# Patient Record
Sex: FEMALE | Race: BLACK OR AFRICAN AMERICAN | NOT HISPANIC OR LATINO | Employment: UNEMPLOYED | URBAN - METROPOLITAN AREA
[De-identification: names, ages, dates, MRNs, and addresses within clinical notes are randomized per-mention and may not be internally consistent; named-entity substitution may affect disease eponyms.]

---

## 2024-03-08 ENCOUNTER — TELEPHONE (OUTPATIENT)
Dept: TRANSPLANT | Facility: CLINIC | Age: 34
End: 2024-03-08

## 2024-03-11 ENCOUNTER — TELEPHONE (OUTPATIENT)
Dept: TRANSPLANT | Facility: CLINIC | Age: 34
End: 2024-03-11
Payer: MEDICARE

## 2024-04-01 ENCOUNTER — TELEPHONE (OUTPATIENT)
Dept: TRANSPLANT | Facility: CLINIC | Age: 34
End: 2024-04-01
Payer: MEDICARE

## 2024-04-03 DIAGNOSIS — Z76.82 ORGAN TRANSPLANT CANDIDATE: Primary | ICD-10-CM

## 2024-04-15 ENCOUNTER — TELEPHONE (OUTPATIENT)
Dept: TRANSPLANT | Facility: CLINIC | Age: 34
End: 2024-04-15
Payer: MEDICARE

## 2024-04-16 ENCOUNTER — TELEPHONE (OUTPATIENT)
Dept: TRANSPLANT | Facility: CLINIC | Age: 34
End: 2024-04-16
Payer: MEDICARE

## 2024-04-18 ENCOUNTER — HOSPITAL ENCOUNTER (OUTPATIENT)
Dept: RADIOLOGY | Facility: HOSPITAL | Age: 34
Discharge: HOME | End: 2024-04-18
Attending: NURSE PRACTITIONER
Payer: MEDICARE

## 2024-04-18 ENCOUNTER — HOSPITAL ENCOUNTER (OUTPATIENT)
Dept: RADIOLOGY | Facility: HOSPITAL | Age: 34
Discharge: HOME | End: 2024-04-18
Payer: MEDICARE

## 2024-04-18 ENCOUNTER — OFFICE VISIT (OUTPATIENT)
Dept: TRANSPLANT | Facility: CLINIC | Age: 34
End: 2024-04-18
Payer: MEDICARE

## 2024-04-18 VITALS
WEIGHT: 207 LBS | HEART RATE: 70 BPM | RESPIRATION RATE: 16 BRPM | SYSTOLIC BLOOD PRESSURE: 169 MMHG | DIASTOLIC BLOOD PRESSURE: 95 MMHG | OXYGEN SATURATION: 99 % | BODY MASS INDEX: 39.08 KG/M2 | TEMPERATURE: 97 F | HEIGHT: 61 IN

## 2024-04-18 DIAGNOSIS — I12.0 HYPERTENSIVE NEPHROSCLEROSIS, STAGE 5 CHRONIC KIDNEY DISEASE OR END STAGE RENAL DISEASE: Primary | ICD-10-CM

## 2024-04-18 DIAGNOSIS — Z76.82 ORGAN TRANSPLANT CANDIDATE: ICD-10-CM

## 2024-04-18 DIAGNOSIS — Z01.818 PRE-TRANSPLANT EVALUATION FOR CHRONIC KIDNEY DISEASE: ICD-10-CM

## 2024-04-18 DIAGNOSIS — D63.1 ANEMIA, CHRONIC RENAL FAILURE, STAGE 5: ICD-10-CM

## 2024-04-18 DIAGNOSIS — F12.10 CANNABIS ABUSE: ICD-10-CM

## 2024-04-18 DIAGNOSIS — N18.5 CKD (CHRONIC KIDNEY DISEASE) STAGE 5, GFR LESS THAN 15 ML/MIN: ICD-10-CM

## 2024-04-18 DIAGNOSIS — N18.5 ANEMIA, CHRONIC RENAL FAILURE, STAGE 5: ICD-10-CM

## 2024-04-18 DIAGNOSIS — N25.81 SECONDARY HYPERPARATHYROIDISM OF RENAL ORIGIN: ICD-10-CM

## 2024-04-18 PROBLEM — I12.9 HYPERTENSIVE NEPHROSCLEROSIS: Status: ACTIVE | Noted: 2024-04-18

## 2024-04-18 PROCEDURE — 3008F BODY MASS INDEX DOCD: CPT | Mod: CPTII,S$GLB,TXP, | Performed by: INTERNAL MEDICINE

## 2024-04-18 PROCEDURE — 71046 X-RAY EXAM CHEST 2 VIEWS: CPT | Mod: TC,TXP

## 2024-04-18 PROCEDURE — 97802 MEDICAL NUTRITION INDIV IN: CPT | Mod: S$GLB,TXP,, | Performed by: DIETITIAN, REGISTERED

## 2024-04-18 PROCEDURE — 3066F NEPHROPATHY DOC TX: CPT | Mod: CPTII,S$GLB,TXP, | Performed by: INTERNAL MEDICINE

## 2024-04-18 PROCEDURE — 99999 PR PBB SHADOW E&M-EST. PATIENT-LVL IV: CPT | Mod: PBBFAC,TXP,, | Performed by: INTERNAL MEDICINE

## 2024-04-18 PROCEDURE — 76770 US EXAM ABDO BACK WALL COMP: CPT | Mod: TC,TXP

## 2024-04-18 PROCEDURE — 99205 OFFICE O/P NEW HI 60 MIN: CPT | Mod: S$GLB,TXP,, | Performed by: INTERNAL MEDICINE

## 2024-04-18 PROCEDURE — 99204 OFFICE O/P NEW MOD 45 MIN: CPT | Mod: S$GLB,TXP,, | Performed by: PHYSICIAN ASSISTANT

## 2024-04-18 PROCEDURE — 76770 US EXAM ABDO BACK WALL COMP: CPT | Mod: 26,TXP,, | Performed by: RADIOLOGY

## 2024-04-18 PROCEDURE — 1160F RVW MEDS BY RX/DR IN RCRD: CPT | Mod: CPTII,S$GLB,TXP, | Performed by: INTERNAL MEDICINE

## 2024-04-18 PROCEDURE — 71046 X-RAY EXAM CHEST 2 VIEWS: CPT | Mod: 26,TXP,, | Performed by: RADIOLOGY

## 2024-04-18 PROCEDURE — 99204 OFFICE O/P NEW MOD 45 MIN: CPT | Mod: S$GLB,TXP,, | Performed by: TRANSPLANT SURGERY

## 2024-04-18 PROCEDURE — 1159F MED LIST DOCD IN RCRD: CPT | Mod: CPTII,S$GLB,TXP, | Performed by: INTERNAL MEDICINE

## 2024-04-18 PROCEDURE — 3077F SYST BP >= 140 MM HG: CPT | Mod: CPTII,S$GLB,TXP, | Performed by: INTERNAL MEDICINE

## 2024-04-18 PROCEDURE — 3080F DIAST BP >= 90 MM HG: CPT | Mod: CPTII,S$GLB,TXP, | Performed by: INTERNAL MEDICINE

## 2024-04-18 PROCEDURE — 72170 X-RAY EXAM OF PELVIS: CPT | Mod: 26,TXP,, | Performed by: RADIOLOGY

## 2024-04-18 PROCEDURE — 72170 X-RAY EXAM OF PELVIS: CPT | Mod: TC,TXP

## 2024-04-18 RX ORDER — NIFEDIPINE 60 MG/1
60 TABLET, EXTENDED RELEASE ORAL DAILY
COMMUNITY

## 2024-04-18 RX ORDER — CARVEDILOL 25 MG/1
25 TABLET ORAL 2 TIMES DAILY WITH MEALS
COMMUNITY

## 2024-04-18 RX ORDER — TORSEMIDE 100 MG/1
100 TABLET ORAL DAILY
COMMUNITY

## 2024-04-18 RX ORDER — HYDRALAZINE HYDROCHLORIDE 100 MG/1
100 TABLET, FILM COATED ORAL 2 TIMES DAILY
COMMUNITY

## 2024-04-18 RX ORDER — CALCITRIOL 0.25 UG/1
0.5 CAPSULE ORAL DAILY
COMMUNITY

## 2024-04-18 NOTE — PROGRESS NOTES
"TRANSPLANT NUTRITIONAL ASSESSMENT    Referring Provider: JENN Arciniega MD     Reason for Visit: Pre-kidney transplant work-up (pre-dialysis)    Age: 33 y.o.  Sex: female    Patient Active Problem List   Diagnosis    Hypertensive nephrosclerosis    BMI 38.0-38.9,adult    CKD (chronic kidney disease) stage 5, GFR less than 15 ml/min    Anemia, chronic renal failure, stage 5    Secondary hyperparathyroidism of renal origin     Past Medical History:   Diagnosis Date    Anemia     Anemia, chronic renal failure, stage 5 04/18/2024    CKD (chronic kidney disease) stage 5, GFR less than 15 ml/min 04/18/2024    Disorder of kidney and ureter     Hyperparathyroidism, unspecified     Hypertension     Hypertensive nephrosclerosis 04/18/2024    Metabolic acidosis     Obesity     Renal interstitial fibrosis     Secondary hyperparathyroidism of renal origin 04/18/2024    SOB (shortness of breath)     History of    Systemic lupus erythematosus     Urinary tract infection, site not specified     History of     Lab Results   Component Value Date    GLU 79 04/18/2024    K 3.7 04/18/2024    PHOS 3.9 04/18/2024    CHOL 244 (H) 04/18/2024    HDL 43 04/18/2024    TRIG 184 (H) 04/18/2024    ALBUMIN 3.8 04/18/2024    CALCIUM 10.0 04/18/2024     Other Pertinent Labs: No other pertinent labs.     Current Outpatient Medications   Medication Sig Dispense Refill    calcitRIOL (ROCALTROL) 0.25 MCG Cap Take 0.5 mcg by mouth once daily.      carvediloL (COREG) 25 MG tablet Take 25 mg by mouth 2 (two) times daily with meals.      hydrALAZINE (APRESOLINE) 100 MG tablet Take 100 mg by mouth 2 (two) times daily.      NIFEdipine (ADALAT CC) 60 MG TbSR Take 60 mg by mouth once daily.      torsemide (DEMADEX) 100 MG Tab Take 100 mg by mouth once daily.       No current facility-administered medications for this visit.     Allergies: Patient has no known allergies.    Ht Readings from Last 1 Encounters:   04/18/24 5' 1.22" (1.555 m)     Wt Readings " from Last 1 Encounters:   04/18/24 93.9 kg (207 lb 0.2 oz)      BMI: Body mass index is 38.83 kg/m².    Usual Weight: 207#  Weight Change/Time: 27# weight gain since 2021 after catheter surgery  Current Diet: Regular  Appetite/Current Intake: good   Exercise/Physical Activity: , standing/moving around on her feet the entire workday   Nutritional/Herbal Supplements: None  Potential Food/Medication Interactions: reviewed  Chewing/Swallowing Problems: Gets tired of chewing easily, states she has SOB when trying to chew, swallow, and breathe.   Symptoms: vomiting (occurrence 1x/month)  Assessment of Lab Values: Cholesterol: 244 (H), Triglycerides: 184 (H)   Support System: caregiver, she can grocery shop and cook meals at home    Estimated Kcal Need: 1408.5-1878 kcal/day (15-20 kcal/kg/day)  Estimated Protein Need: 55-75 g/day (0.6-0.8 g/kg/day)    Nutritional History: Pt. States her appetite is good and eats 1 meal/day with snacks because she gets tired of chewing when eating a meal. Stated she tries to follow a renal diet.   B: none   L: meat and bread, sandwich with chips (only meal of the day)  D: none   Snacks: chips, fruit (oranges, pineapple, watermelon, grapes)  Beverages: sweet tea, lemonade, cranapple juice, water with flavour enhancer    Nutritional Diagnoses  Problem: undesirable food choices  Etiology: related to diet requirement for a renal diet  Symptoms: as evidenced by diet recall including sugar sweetened beverages, highly processed foods (chips, sandwiches), and limited fruit/vegetable consumption.     Educational Need? yes  Barriers: none identified  Discussed with: patient  Interventions: Patient taught nutrition information regarding Pre-kidney transplant work-up (pre-dialysis)  Renal Nutrition Therapy packet reviewed (high/low food sources of K, Phos and protein, low sodium and fluid intake, emphasis on moderate protein intake).  Encouraged physical activity daily, regular  exercise as tolerated, stay mobile.    Goals/Recommendations: diet adherence, limit high potassium foods, and limit intake of high phosphorus foods  Actions Taken: instruct/provide written information  Strategies Used: problem solving, goal setting, motivational interviewing  Patient and/or family comprehend instructions: yes , adherence expected  Outcome: Verbalizes understanding  Monitoring: Contact information provided, will f/u in clinic and communicate with the care team as needed.     Counseling Time: 15 minutes    I certify that I directed the dietetic intern in service delivery and guided them using my skilled judgment. As the cosigning dietitian, I have reviewed the dietetic interns documentation and am responsible for the treatment, assessment, and plan.  Mateo Bah Dietetic Intern

## 2024-04-18 NOTE — PROGRESS NOTES
Transplant Surgery  Kidney Transplant Recipient Evaluation    Referring Physician: Demian Fu  Current Nephrologist: Demian Fu    Subjective:     Reason for Visit: evaluate transplant candidacy    History of Present Illness: Jaylon Hanks is a 33 y.o. year old female undergoing transplant evaluation.    Dialysis History: Jaylon is on peritoneal dialysis.      Transplant History: N/A    Etiology of Renal Disease: Systemic Lupus Erythematosus (based on medical records from referral).    External provider notes reviewed: Yes    Review of Systems  Objective:     Physical Exam:  Constitutional:   Vitals reviewed: yes   Well-nourished and well-groomed: yes  Eyes:   Sclerae icteric: no   Extraocular movements intact: yes  GI:    Bowel sounds normal: yes   Tenderness: no    If yes, quadrant/location: not applicable   Palpable masses: no    If yes, quadrant/location: not applicable   Hepatosplenomegaly: no   Ascites: no   Hernia: no    If yes, type/location: not applicable   Surgical scars: yes    If yes, type/location: Lap port sites  Resp:   Effort normal: yes   Breath sounds normal: yes    CV:   Regular rate and rhythm: yes   Heart sounds normal: yes   Femoral pulses normal: yes   Extremities edematous: no  Skin:   Rashes or lesions present: no    If yes, describe:not applicable   Jaundice:: no    Musculoskeletal:   Gait normal: yes   Strength normal: yes  Psych:   Oriented to person, place, and time: yes   Affect and mood normal: yes    Additional comments: not applicable    Diagnostics:  The following labs have been reviewed: CBC  CMP  The following radiology images have been independently reviewed and interpreted: CT Abd/Pelvis    Counseling: We provided Jaylon Hanks with a group education session today.  We discussed kidney transplantation at length with her, including risks, potential complications, and alternatives in the management of her renal failure.  The discussion included complications  related to anesthesia, bleeding, infection, primary nonfunction, and ATN.  I discussed the typical postoperative course, length of hospitalization, the need for long-term immunosuppression, and the need for long-term routine follow-up.  I discussed living-donor and -donor transplantation and the relative advantages and disadvantages of each.  I also discussed average waiting times for both living donation and  donation.  I discussed national and center-specific survival rates.  I also mentioned the potential benefit of multicenter listing to candidates listed with centers within more than one organ procurement organization.  All questions were answered.    Patient advised that it is recommended that all transplant candidates, and their close contacts and household members receive Covid vaccination.    Final determination of transplant candidacy will be made once evaluation is complete and reviewed by the Kidney & Kidney/Pancreas Selection Committee.    Coronavirus disease (COVID-19) caused by severe acute respiratory virus coronavirus 2 (SARS-C0V 2) is associated with increased mortality in solid organ transplant recipients (SOT) compared to non-transplant patients. Vaccine responses to vaccination are depressed against SARS-CoV2 compared to normal individuals but improve with third vaccination doses. Vaccination prior to SOT provides both the best opportunity for transplant candidates to develop protective immunity and to reduce the risk of serious COVID19 infections post transplantation. Organ transplant candidates at [facility] will be required to receive SARS-CoV-2 vaccination prior to being listed with a an active status, whenever possible. Exceptions will be made for disability related reasons or for sincerely held Mandaen beliefs.          Transplant Surgery - Candidacy   Assessment/Plan:   Jaylon Hanks has end stage renal disease (ESRD) on dialysis. I  see no surgical contraindication to placing a kidney transplant. Based on available information, Jaylon Hanks is a suitable kidney transplant candidate. I encouraged weight loss and living donation.    Additional testing to be completed according to the Written Order Guidelines for Adult Pre-kidney and Pancreas Transplant Evaluation (KI-02).  Interpretation of tests and discussion of patient management involves all members of the multidisciplinary transplant team.    Yehuda Monique MD

## 2024-04-18 NOTE — PROGRESS NOTES
PRE-TRANSPLANT INFECTIOUS DISEASE CONSULT    Reason for Visit:  Pre-transplant evaluation  Referring Provider: Dr. Demian Fu     History of Present Illness:    33 y.o. female with a history of ERSD 2/2 HTN presents for pre-kidney transplant evaluation. Pt is pre dialysis.     Infectious History:  Recent hospital admissions: No  Recent infections: No  Recent or current antibiotic use: No  History of recurrent infections *(sinus / pneumonia / UTI / SBP)*: No  History of diabetic foot wound or bone/joint infection: No  Recent dental infections, issues or procedures: No  History of chicken pox: Yes  History of shingles: No  History of STI: No  History of COVID infection: No    History of Immunosuppression:  Prior chemotherapy / immunosuppression: No  Prior transplant: No  History of splenectomy: No    Tuberculosis:  Prior screening for latent TB: No  Prior diagnosis of latent TB: No  Risk factors for TB *known exposure, incarceration, homelessness*: No    Geographical exposures:  Currently lives in [place] with boyfriend  Lived in the following states: [place] ([facility]   Lived or travelled to the [facility] [place]: Yes  International travel: No  Travel-associated illness: No      Social/Environmental:  Occupation:   employer, caregiver   Pets: Yes indoor two dogs, beta fish  Livestock: No  Fishing / hunting: No  Hobbies: gardening ocassionally to help partner   Water: [place]  Consumption of raw/undercooked meat or seafood?  No  Tobacco: None  Alcohol: No  Recreational drug use:  Yes, smoke marijuana.   Sexual partners: partner       Past Histories:   Past Medical History:   Diagnosis Date    Anemia     Anemia, chronic renal failure, stage 5 04/18/2024    CKD (chronic kidney disease) stage 5, GFR less than 15 ml/min 04/18/2024    Disorder of kidney and ureter     Hyperparathyroidism, unspecified     Hypertension     Hypertensive nephrosclerosis 04/18/2024    Metabolic acidosis     Obesity     Renal  interstitial fibrosis     Secondary hyperparathyroidism of renal origin 04/18/2024    SOB (shortness of breath)     History of    Systemic lupus erythematosus     Urinary tract infection, site not specified     History of     Past Surgical History:   Procedure Laterality Date    HERNIA REPAIR      PERITONEAL CATHETER INSERTION      11/2020-06/2021     Family History   Problem Relation Name Age of Onset    Hypertension Mother      COPD Mother      Kidney disease Mother      Heart disease Mother          CHF    Lupus Mother      Scleroderma Mother      Hyperlipidemia Father      Hypertension Father      Cancer Father          pancreatic cancer    Hypertension Sister      Stroke Sister      Seizures Sister      No Known Problems Brother       Social History     Tobacco Use    Smoking status: Never    Smokeless tobacco: Never   Substance Use Topics    Alcohol use: Not Currently    Drug use: Yes     Frequency: 7.0 times per week     Types: Marijuana     Comment: 3 to 4 times a day  for the last 2021     Review of patient's allergies indicates:  No Known Allergies      Immunization History:  Received all childhood vaccines: Yes  All household members receive annual flu vaccine: Yes  All household members are up to date on COVID vaccine: No      Current antibiotics:  Antibiotics (From admission, onward)      None              Review of Systems  Review of Systems   Constitutional: Negative for chills, decreased appetite, fever, malaise/fatigue, night sweats, weight gain and weight loss.   HENT:  Negative for congestion, ear pain, hearing loss, hoarse voice, sore throat and tinnitus.    Eyes:  Negative for blurred vision, pain, vision loss in left eye, vision loss in right eye and visual disturbance.   Cardiovascular:  Negative for chest pain, dyspnea on exertion, leg swelling and palpitations.   Respiratory:  Negative for cough, shortness of breath, sputum production and wheezing.    Skin:  Negative for dry skin, itching,  rash and suspicious lesions.   Musculoskeletal:  Negative for back pain, joint pain, myalgias and neck pain.   Gastrointestinal:  Negative for abdominal pain, constipation, diarrhea, heartburn, nausea and vomiting.   Genitourinary:  Negative for dysuria, flank pain, frequency, hematuria, hesitancy and urgency.   Neurological:  Negative for dizziness, headaches, numbness, paresthesias and weakness.   Psychiatric/Behavioral:  Negative for depression and memory loss. The patient does not have insomnia and is not nervous/anxious.           Objective  Physical Exam  Vitals and nursing note reviewed.   Constitutional:       General: She is not in acute distress.     Appearance: She is well-developed. She is not diaphoretic.   HENT:      Head: Normocephalic and atraumatic.   Eyes:      Pupils: Pupils are equal, round, and reactive to light.   Cardiovascular:      Rate and Rhythm: Normal rate and regular rhythm.      Heart sounds: Normal heart sounds. No murmur heard.     No friction rub. No gallop.   Pulmonary:      Effort: Pulmonary effort is normal. No respiratory distress.      Breath sounds: Normal breath sounds. No wheezing or rales.   Chest:      Chest wall: No tenderness.   Abdominal:      General: Bowel sounds are normal. There is no distension.      Palpations: Abdomen is soft. There is no mass.      Tenderness: There is no abdominal tenderness. There is no guarding or rebound.      Hernia: No hernia is present.   Musculoskeletal:         General: No tenderness or deformity. Normal range of motion.      Cervical back: Normal range of motion and neck supple.   Skin:     General: Skin is warm and dry.      Coloration: Skin is not pale.      Findings: No erythema.   Neurological:      Mental Status: She is alert and oriented to person, place, and time.      Cranial Nerves: No cranial nerve deficit.      Coordination: Coordination normal.   Psychiatric:         Behavior: Behavior normal.         Thought Content:  "Thought content normal.           Labs:    CBC:   Lab Results   Component Value Date    WBC 5.44 04/18/2024    HGB 10.3 (L) 04/18/2024    HCT 33.5 (L) 04/18/2024    MCV 74 (L) 04/18/2024     04/18/2024    GRAN 3.4 04/18/2024    GRAN 61.9 04/18/2024    LYMPH 1.4 04/18/2024    LYMPH 25.0 04/18/2024    MONO 0.4 04/18/2024    MONO 7.9 04/18/2024    EOSINOPHIL 4.4 04/18/2024       Syphilis screening: No results found for: "RPR", "PRPQ", "FTAABS"     TB screening: No results found for: "TBGOLDPLUS", "TSPOTSCREN"    HIV screening:   Lab Results   Component Value Date    [id number] Non-reactive 04/18/2024       Strongyloides IgG: No results found for: "STRONGANTIGG"    Hepatitis Serologies:   Lab Results   Component Value Date    HEPAIGG Non-reactive 04/18/2024    HEPBCAB Non-reactive 04/18/2024    HEPBSAB <3.00 04/18/2024    HEPBSAB Non-reactive 04/18/2024    HEPCAB Non-reactive 04/18/2024        Varicella IgG: No results found for: "VARICELLAINT"        There is no immunization history on file for this patient.       Assessment and Plan    1. Risks of Infection: Available serologies were reviewed. No unusual risks of infection or significant barriers to transplantation were identified from the infectious disease standpoint given the information available at this time.    - Acute infectious issues: None   - Pending serologies: HIV, Quantiferon gold / T-spot, RPR, Strongyloides IgG, and VZV IgG will check coccidiodes as lived in [facility].    - Please call if any pending serologic testing is positive.    2. Immunizations:  Based on the patient's immunization history and serologies, the following immunizations are recommended:  - Hepatitis A    Patient does not have immunity to hepatitis A    Vaccination ordered today: Yes   - Hepatitis B    Patient does not have immunity to hepatitis B    Vaccination ordered today: Yes   - COVID    Current CDC vaccination recommendations were discussed with the patient   - Annual " high dose influenza     Vaccination ordered today: No. Reason for not ordering: vaccination up to date   - Prevnar 20    Vaccination ordered today: Yes   - Tdap    Vaccination ordered today: Yes   - Shingrix    Vaccination ordered today: Yes    Recommended Pre-Transplant Immunization Schedule   Vaccine  0m 1m 2m 6m   Pneumococcal conjugate vaccine (Prevnar 20) X      Tetanus-diphtheria-pertussis (Tdap)* X      Hepatitis A Vaccine (Havrix)** X   X   Hepatitis B Vaccine (Heplisav)** X X     Influenza (annual) X      Zoster Recombinant Vaccine (Shingrix) X  X           *Administer booster every 10 years.       **Administer if no immunity demonstrated on serologies               Patient will receive vaccines at local pharmacy. A written prescription was provided for all vaccine doses.     3. Counseling:   I discussed with the patient the risk for increased susceptibility to infections following transplantation including increased risk for infection right after transplant and if rejection should occur.  The patient has been counseled on the importance of vaccinations to decrease risk of infection and severe illness. Specific guidance has been provided to the patient regarding the patient's occupation, hobbies and activities to avoid future infectious complications.     4. Transplant Candidacy: Based on available information, there are no identified significant barriers to transplantation from an infectious disease standpoint.  Final determination of transplant candidacy will be made once evaluation is complete and reviewed by the Selection Committee.      Follow up with infectious disease as needed.       The total time for evaluation and management services performed on 04/18/2024 was greater than 35 minutes.

## 2024-04-18 NOTE — LETTER
April 22, 2024        Demian Fu  [address]  [facility] [address]  Phone: [phone]  Fax: [phone]             [facility] Trans[facility] 1st Fl  [address]  [facility] [address]  Phone: [phone]   Patient: Jaylon Hanks   MR Number: [id number]   YOB: 1990   Date of Visit: 4/18/2024       Dear Dr. Demian Fu    Thank you for referring Jaylon Hanks to me for evaluation. Attached you will find relevant portions of my assessment and plan of care.    If you have questions, please do not hesitate to call me. I look forward to following Jaylon Hanks along with you.    Sincerely,    Kareem Salguero MD    Enclosure    If you would like to receive this communication electronically, please contact [email or web address] or [phone] to request [employer] Link access.    [employer] Link is a tool which provides read-only access to select patient information with whom you have a relationship. Its easy to use and provides real time access to review your patients record including encounter summaries, notes, results, and demographic information.    If you feel you have received this communication in error or would no longer like to receive these types of communications, please e-mail [email or web address]

## 2024-04-18 NOTE — PROGRESS NOTES
Transplant Nephrology  Kidney Transplant Recipient Evaluation    Referring Physician: Demian Fu  Current Nephrologist: Demian Fu    Subjective:   CC:  Initial evaluation of kidney transplant candidacy.    HPI:  Ms. Hanks is a 33 y.o. year old Black or  female who has presented to be evaluated as a potential kidney transplant recipient.  She has advanced kidney disease secondary to HTN.  Patient is currently pre-dialysis. She has a  no dialysis access   . She had a PD catheter placed in 2021 but did not function, migrated and was removed at the time she and a hernia surgery.  At the present time no living donors. BMI is 38 and not very active. Works full time. She is here with  her boyfriend who is the care giver.   She uses cannabis daily three times a day. Does no use any other illegal drug    Previous Transplant: no    Past Medical and Surgical History: Ms. Hanks  has a past medical history of Anemia, Disorder of kidney and ureter, Hyperparathyroidism, unspecified, Hypertension, Metabolic acidosis, Obesity, Renal interstitial fibrosis, SOB (shortness of breath), Systemic lupus erythematosus, and Urinary tract infection, site not specified.  She has a past surgical history that includes Peritoneal catheter insertion and Hernia repair.    Past Social and Family History: Ms. Hanks reports that she has never smoked. She has never used smokeless tobacco. She reports that she does not currently use alcohol. She reports current drug use. Frequency: 7.00 times per week. Drug: Marijuana. Her family history includes COPD in her mother; Cancer in her father; Heart disease in her mother; Hyperlipidemia in her father; Hypertension in her father, mother, and sister; Kidney disease in her mother; Lupus in her mother; No Known Problems in her brother; Scleroderma in her mother; Seizures in her sister; Stroke in her sister.    Current Outpatient Medications on File Prior to Visit   Medication Sig  "Dispense Refill    calcitRIOL (ROCALTROL) 0.25 MCG Cap Take 0.5 mcg by mouth once daily.      carvediloL (COREG) 25 MG tablet Take 25 mg by mouth 2 (two) times daily with meals.      hydrALAZINE (APRESOLINE) 100 MG tablet Take 100 mg by mouth 2 (two) times daily.      NIFEdipine (ADALAT CC) 60 MG TbSR Take 60 mg by mouth once daily.      torsemide (DEMADEX) 100 MG Tab Take 100 mg by mouth once daily.       No current facility-administered medications on file prior to visit.        Review of Systems    Skin: no skin rash  CNS; no headaches, blurred vision, seizure, or syncope  ENT: No JVD,  Adenopathies,  nasal congestion. No oral lesions  Cardiac: No chest pain, dyspnea, claudication, edema or palpitations  Respiratory: No SOB, cough, hemoptysis   Gastro-intestinal: No diarrhea, constipation, abdominal pain, nausea, vomit. No ascitis  Genitourinary: no hematuria, dysuria, frequency, frequency  Musculoskeletal: joint pain, arthritis or vasculitic changes  Psych: alert awake, oriented, No cranial nerves deficit.      Objective:   Blood pressure (!) 169/95, pulse 70, temperature 97.3 °F (36.3 °C), temperature source Temporal, resp. rate 16, height 5' 1.22" (1.555 m), weight 93.9 kg (207 lb 0.2 oz), SpO2 99%.body mass index is 38.83 kg/m².    Physical Exam    Head: normocephalic  Neck: No JVD, cervical axillary, or femoral adenopathies  Heart: no murmurs, Normal s1 and s2, No gallops, no rubs, No murmurs  Lungs; CTA, good respiratory effort, no crackles  Abdomen: soft, non tender, no splenomegaly or hepatomegaly, no massess, no bruits. Abdominal obesity   Extremities: No edema, skin rash, joint pain  SNC: awake, alert oriented. Cranial nerves are intact, no focalized, sensitivity and strength preserved      Labs:  Lab Results   Component Value Date    WBC 5.44 04/18/2024    HGB 10.3 (L) 04/18/2024    HCT 33.5 (L) 04/18/2024       No results found for: "PREALBUMIN", "BILIRUBINUA", "GGT", "AMYLASE", "LIPASE", "PROTEINUA", " ""NITRITE", "RBCUA", "WBCUA"    No results found for: "HLAABCTYPE"    Labs were reviewed with the patient.    Assessment:     1. Hypertensive nephrosclerosis, stage 5 chronic kidney disease or end stage renal disease    2. BMI 38.0-38.9,adult    3. CKD (chronic kidney disease) stage 5, GFR less than 15 ml/min    4. Anemia, chronic renal failure, stage 5    5. Secondary hyperparathyroidism of renal origin        Plan:     Transplant Candidacy:   Based on available information, Ms. Hanks is a suitable kidney transplant candidate.   Meets center eligibility for accepting HCV+ donor offer - Yes.  Patient educated on HCV+ donors. Jaylon is willing to accept HCV+ donor offer - Yes   Patient is a candidate for KDPI > 85 kidney donor offer - No.  Final determination of transplant candidacy will be made once workup is complete and reviewed by the selection committee.    We discussed excessive cannabis use and implications for kidney transplant outcomes. Will order a drug screen.   We discussed living donation, wt loss.  Extensive education provided  All questions answered.     Patient advised that it is recommended that all transplant candidates, and their close contacts and household members receive Covid vaccination.    UNOS Patient Status  Functional Status: 80% - Normal activity with effort: some symptoms of disease    Diabetes: No   Kareem Salguero MD         "

## 2024-04-18 NOTE — PROGRESS NOTES
PHARM.D. PRE-TRANSPLANT NOTE:    This patient's medication therapy was evaluated as part of her pre-transplant evaluation.      The following general pharmacologic concerns were noted: None     The following concerns for post-operative pain management were noted: None    The following pharmacologic concerns related to HCV therapy were noted: None      This patient's medication profile was reviewed for considerations for DAA Hepatitis C therapy:    [X]  No current inducers of CYP 3A4 or PGP  [X]  No amiodarone on this patient's EMR profile in the last 24 months  [X]  No past or current atrial fibrillation on this patient's EMR profile       Current Outpatient Medications   Medication Sig Dispense Refill    calcitRIOL (ROCALTROL) 0.25 MCG Cap Take 0.5 mcg by mouth once daily.      carvediloL (COREG) 25 MG tablet Take 25 mg by mouth 2 (two) times daily with meals.      hydrALAZINE (APRESOLINE) 100 MG tablet Take 100 mg by mouth 2 (two) times daily.      NIFEdipine (ADALAT CC) 60 MG TbSR Take 60 mg by mouth once daily.      torsemide (DEMADEX) 100 MG Tab Take 100 mg by mouth once daily.       No current facility-administered medications for this visit.           I am available for consultation and can be contacted, as needed by the other members of the Transplant team.

## 2024-04-21 NOTE — PROGRESS NOTES
"INITIAL PATIENT EDUCATION NOTE     Ms. Jaylon Hanks was seen in pre-kidney transplant clinic for evaluation for kidney, kidney/pancreas or pancreas only transplant.  The patient attended an individual video education session that discussed/reviewed the following aspects of transplantation: evaluation including diagnostic and laboratory testing,(Chemistries, Hematology, Serologies including HIV and Hepatitis and HLA) required for transplantation and selection committee process, UNOS waitlist management/multiple listings, types of organs offered (KDPI < 85%, KDPI > 85%, PHS risk, DCD, HCV+, HIV+ for HIV+ recipients and enbloc/dual), financial aspects, surgical procedures, dietary instruction pre- and post-transplant, health maintenance pre- and post-transplant, post-transplant hospitalization and outpatient follow-up, potential to participate in a research protocol, and medication management and side effects.  A question and answer session was provided after the presentation.    The patient was seen by all members of the multi-disciplinary team to include: Nephrologist/RITIKA, Surgeon, , Transplant Coordinator, , Pharmacist and Dietician (if applicable).    The patient reviewed and signed all consents for evaluation which were witnessed and sent to scanning into the [facility] chart.    The patient was given an education book and plan for further evaluation based on her individual assessment.    The Patient was educated on OPTN policy change regarding race based eGFR. For Black or  Americans, this eGFR could have shown that their kidneys were working better than they were.    Because of this change, we are looking at everyone's record and assessing waiting time for people who are eligible. We will be reviewing your medical records and will notify you if you are eligible. We also encouraged patient to provide "span 20 labs" that are not in our electronic medical records.     Reviewed " program requirement for complete COVID vaccination with documentation prior to listing.  COVID education information reviewed with patient. Patient encouraged to be up to date on all vaccinations.     The patient was informed that the transplant team would manage immediate post op pain. If the patient requires long term pain management, they will need to have that pain management addressed by their PCP or previous provider who wrote for long term pain medicines.    The patient was encouraged to call with any questions or concerns.

## 2024-04-24 NOTE — PROGRESS NOTES
Transplant Recipient Adult Psychosocial Assessment    Jaylon Hanks  [address] [facility] [address]  Telephone Information:   Mobile [phone]   Home  [phone] (home)  Work  There is no work phone number on file.  E-mail  [email or web address]    Sex: female  YOB: 1990  Age: 33 y.o.    Encounter Date: 2024  [place] Citizen: yes  Primary Language: English   Needed: no    Emergency Contact:  Name: Jose Maria Beckford  Relationship: significant other  Address: same as pt   Phone Numbers:[phone] (mobile)    Family/Social Support:   Number of dependents/: Pt reports no minor dependents   Marital history: Pt reports no marital history. Pt reports current relationship with Jose Maria for 3.5 yrs  Other family dynamics: Pt resides with s/o Jose Maria. Pt reports highly supportive mother and 5 siblings.  Pt's father is .     Household Composition:  Name: Jose Maria Beckford  Age: 28  Number: [phone]  Relationship: significant other  Does person drive? yes    Do you and your caregivers have access to reliable transportation? yes  PRIMARY CAREGIVER: Jose Maria Beckford will be primary caregiver, phone number [phone].      provided in-depth information to patient and caregiver regarding pre- and post-transplant caregiver role.   strongly encourages patient and caregiver to have concrete plan regarding post-transplant care giving, including back-up caregiver(s) to ensure care giving needs are met as needed.    Patient and Caregiver states understanding all aspects of caregiver role/commitment and is able/willing/committed to being caregiver to the fullest extent necessary.    Patient and Caregiver verbalizes understanding of the education provided today and caregiver responsibilities.         remains available. Patient and Caregiver agree to contact  in a timely manner if concerns arise.      Able to take time off work  without financial concerns: yes.     Additional Significant Others who will Assist with Transplant:  Name: Silvana Tena   Age: 64  Number: [phone]  City: [place] State: [place]  Relationship: mother  Does person drive? yes    Name: Roz Tena  Age: none provided   Number: [phone]  City: [place]: [place]  Relationship: sister  Does person drive? yes    Living Will: no  Healthcare Power of : no Pt reports trusting mother with medical decisions  Advance Directives on file: <<no information> per medical record.  Verbally reviewed LW/HCPA information.   provided patient with copy of LW/HCPA documents and provided education on completion of forms.    Living Donors: No. Education and resource information given to patient.    Highest Education Level: High School (9-12) or GED  Reading Ability: 12th grade  Reports difficulty with: N/A  Learns Best By:  A combination of verbal, written, and hands-on instruction.       Status: no  VA Benefits: no     Working for Income: yes  If yes, working activity level: Working Full Time  Patient is employed as a  with [employer]. Pt reports plans to pay for STD/LTD.    Spouse/Significant Other Employment: Pt's s/o is employed. Jose Maria reports employed is highly supportive.    Disabled: no    Monthly Income:  Salary/Wages: $1,700  Able to afford all costs now and if transplanted, including medications: yes  Patient and Caregiver verbalizes understanding of personal responsibilities related to transplant costs and the importance of having a financial plan to ensure that patients transplant costs are fully covered.       provided fundraising information/education. Patient and Caregiververbalizes understanding.   remains available.    Insurance:   Payer/Plan Subscr  Sex Relation Sub. Ins. ID Effective Group Num   1. [employer] [facility]* CARMEN TENA 1990 Female Self [id number] [address]                                     [address] [address], [facility] [address]   2. [place]MEN TE[place] 1990 Female Self [id number]* 4/1/21                                    [address] [address]     Primary Insurance (for UNOS reporting): Public Insurance - Medicare FFS (Fee For Service)  Secondary Insurance (for UNOS reporting): Public Insurance - Medicaid  Patient and Caregiver verbalizes clear understanding that patient may experience difficulty obtaining and/or be denied insurance coverage post-surgery. This includes and is not limited to disability insurance, life insurance, health insurance, burial insurance, long term care insurance, and other insurances.      Patient and Caregiver also reports understanding that future health concerns related to or unrelated to transplantation may not be covered by patient's insurance.  Resources and information provided and reviewed.     Patient and Caregiver provides verbal permission to release any necessary information to outside resources for patient care and discharge planning.  Resources and information provided are reviewed.      Dialysis Adherence: Patient reports as predialysis. Pt reports GFR is at 23%.  Pt reports having PD port was removed in 2021.    Infusion Service: patient utilizing? no  Home Health: patient utilizing? no  DME: yes [facility]  Pulmonary/Cardiac Rehab: Pt denies    ADLS:  Pt reports pt is independent with all ADLS including driving, bathing,walking,taking medications, cooking, housekeeping, eating, and shopping.      Adherence: Adherence education and counseling provided.     Per History Section:  Past Medical History:   Diagnosis Date    Anemia     Anemia, chronic renal failure, stage 5 04/18/2024    CKD (chronic kidney disease) stage 5, GFR less than 15 ml/min 04/18/2024    Disorder of kidney and ureter     Hyperparathyroidism, unspecified     Hypertension     Hypertensive nephrosclerosis 04/18/2024    Metabolic acidosis     Obesity     Renal  "interstitial fibrosis     Secondary hyperparathyroidism of renal origin 04/18/2024    SOB (shortness of breath)     History of    Systemic lupus erythematosus     Urinary tract infection, site not specified     History of     Social History     Tobacco Use    Smoking status: Never    Smokeless tobacco: Never   Substance Use Topics    Alcohol use: Not Currently     Social History     Substance and Sexual Activity   Drug Use Yes    Frequency: 7.0 times per week    Types: Marijuana    Comment: 3 to 4 times a day  for the last 2021     Social History     Substance and Sexual Activity   Sexual Activity Yes    Partners: Male       Per Today's Psychosocial:  Tobacco: none, patient denies any use.  Alcohol: none, patient denies any use.  Illicit Drugs/Non-prescribed Medications:  Pt utilizes marijuana and reports it helps her appetite. 3-4 "joints" per day. Pt reports willingness to quit if required  .    Patient and Caregiver states clear understanding of the potential impact of substance use as it relates to transplant candidacy and is aware of possible random substance screening.  Substance abstinence/cessation counseling, education and resources provided and reviewed.     Arrests/DWI/Treatment/Rehab: patient denies    Psychiatric History:    Mental Health: Pt denies history of anxiety, depression and or overwhelming feelings of sadness at this time or in the past. Pt reports "I don't have a lot of bad days. I just have normal life stressors that might bother me from time to time. But I am okay. I am just trying to stay up on my health". SW provided acknowledgement, active listening, validation, normalization, support and encouragement. Pt reports having a highly supportive support systems.  Psychiatrist/Counselor: Pt denies currently or in the past and reports willingness to meet with psychiatry if required by transplant.   Medications:  Pt denies utilizing mental health medications currently or in the " past  Suicide/Homicide Issues: Pt denies feelings of wanting to harm self or other currently or in the past  Safety at home: Pt reports feeling safe at home.     Knowledge: Patient and Caregiver states having clear understanding and realistic expectations regarding the potential risks and potential benefits of organ transplantation and organ donation and agrees to discuss with health care team members and support system members, as well as to utilize available resources and express questions and/or concerns in order to further facilitate the pt informed decision-making.  Resources and information provided and reviewed.    Patient and Caregiver is aware of [facility] affiliation and/or partnership with agencies in home health care, LTAC, SNF, [facility], and other hospitals and clinics.    Understanding: Patient and Caregiver reports having a clear understanding of the many lifetime commitments involved with being a transplant recipient, including costs, compliance, medications, lab work, procedures, appointments, concrete and financial planning, preparedness, timely and appropriate communication of concerns, abstinence (ETOH, tobacco, illicit non-prescribed drugs), adherence to all health care team recommendations, support system and caregiver involvement, appropriate and timely resource utilization and follow-through, mental health counseling as needed/recommended, and patient and caregiver responsibilities.  Social Service Handbook, resources and detailed educational information provided and reviewed.  Educational information provided.    Patient and Caregiver also reports current and expected compliance with health care regime and states having a clear understanding of the importance of compliance.      Patient and Caregiver reports a clear understanding that risks and benefits may be involved with organ transplantation and with organ donation.       Patient and Caregiver also reports clear understanding that  psychosocial risk factors may affect patient, and include but are not limited to feelings of depression, generalized anxiety, anxiety regarding dependence on others, post traumatic stress disorder, feelings of guilt and other emotional and/or mental concerns, and/or exacerbation of existing mental health concerns.  Detailed resources provided and discussed.      Patient and Caregiver agrees to access appropriate resources in a timely manner as needed and/or as recommended, and to communicate concerns appropriately.  Patient and Caregiver also reports a clear understanding of treatment options available.     Patient and Caregiver received education in a group setting.   reviewed education, provided additional information, and answered questions.    Feelings or Concerns: Pt denies having any concerns and or overwhelming feelings regarding transplant at this time.       Coping: Identify Patient & Caregiver Strategies to [place]:   1. In the past, coping with major surgery and/or related stress - familial events    2. Currently & Pre-transplant - family gatherings   3. At the time of surgery - Family support   4. During post-Transplant & Recovery Period - family support    Goals: Pt reports post transplant goal of retaining new normalcy.  Patient referred to Vocational Rehabilitation.    Interview Behavior: Patient and Caregiver presents as alert and oriented x 4, pleasant, good eye contact, well groomed, recall good, concentration/judgement good, average intelligence, calm, communicative, cooperative, and asking and answering questions appropriately. Pt presents with highly supportive s/o Presten. Pt and s/o were highly engaged and motivated for transplant.         Transplant Social Work - Candidacy  Assessment/Plan:     Psychosocial Suitability: Patient presents as a suitable candidate for transplant at this time. Based on psychosocial risk factors, patient presents as low risk, due to absence of  overwhelming psychosocial concerns .    Recommendations/Additional Comments: SW recommends that pt conduct fundraising to assist pt with pay for cost of medications, food, gas, and other transplant related needs. SW recommends that pt remain aware of potential mental health concerns and contact the team if any concerns arise. SW recommends that pt remain abstinent from tobacco, ETOH, and drug use. SW supports pt's continued dialysis adherence. SW remains available to answer any questions or concerns that arise as the pt moves through the transplant process.     Gordo Suh, LYDIA, LMSW

## 2024-04-26 ENCOUNTER — DOCUMENTATION ONLY (OUTPATIENT)
Dept: TRANSPLANT | Facility: CLINIC | Age: 34
End: 2024-04-26
Payer: MEDICARE

## 2024-04-26 ENCOUNTER — TELEPHONE (OUTPATIENT)
Dept: TRANSPLANT | Facility: CLINIC | Age: 34
End: 2024-04-26
Payer: MEDICARE

## 2024-04-26 NOTE — TELEPHONE ENCOUNTER
Phoned patient and informed about additional testing needed for Eval: 2ABO, Stress Test, Echo and Gynecological appointment. Patient wishes to have mailed and done closer to home. Orders being mailed to patient.

## 2024-05-28 ENCOUNTER — TELEPHONE (OUTPATIENT)
Dept: TRANSPLANT | Facility: CLINIC | Age: 34
End: 2024-05-28
Payer: MEDICARE

## 2024-05-28 NOTE — TELEPHONE ENCOUNTER
"----- Message from Cesar Savage sent at 5/28/2024  4:42 PM CDT -----  Consult/Advisory    Name Of Caller: Self    Contact Preference?: [phone]     What is the nature of the call?: Calling to notify Maurice about her upcoming appts (to help meet criteria)    Additional Notes:  "Thank you for all that you do for our patients"  "

## 2024-06-03 ENCOUNTER — TELEPHONE (OUTPATIENT)
Dept: TRANSPLANT | Facility: CLINIC | Age: 34
End: 2024-06-03
Payer: MEDICARE

## 2024-06-03 DIAGNOSIS — Z76.82 ORGAN TRANSPLANT CANDIDATE: Primary | ICD-10-CM

## 2024-06-04 ENCOUNTER — TELEPHONE (OUTPATIENT)
Dept: TRANSPLANT | Facility: CLINIC | Age: 34
End: 2024-06-04
Payer: MEDICARE

## 2024-06-04 NOTE — TELEPHONE ENCOUNTER
Spoke to pt regarding tests that needed to be scheduled. Pt stated she had a Pap Smear done on 5/23. She will go to the office and have the records faxed tomorrow-6/5 . Pt stated she did not have any of the other test completed at this time. She is in the process of getting a new PCP and her appointment is set for August 14.

## 2024-06-07 DIAGNOSIS — Z76.82 ORGAN TRANSPLANT CANDIDATE: Primary | ICD-10-CM

## 2024-06-20 ENCOUNTER — TELEPHONE (OUTPATIENT)
Dept: CARDIOLOGY | Facility: HOSPITAL | Age: 34
End: 2024-06-20
Payer: MEDICARE

## 2024-06-24 ENCOUNTER — HOSPITAL ENCOUNTER (OUTPATIENT)
Dept: CARDIOLOGY | Facility: HOSPITAL | Age: 34
Discharge: HOME | End: 2024-06-24
Attending: NURSE PRACTITIONER
Payer: MEDICARE

## 2024-06-24 VITALS
HEART RATE: 81 BPM | BODY MASS INDEX: 39.08 KG/M2 | DIASTOLIC BLOOD PRESSURE: 78 MMHG | WEIGHT: 207 LBS | HEIGHT: 61 IN | SYSTOLIC BLOOD PRESSURE: 167 MMHG | RESPIRATION RATE: 16 BRPM

## 2024-06-24 VITALS
SYSTOLIC BLOOD PRESSURE: 167 MMHG | HEART RATE: 80 BPM | HEIGHT: 59 IN | WEIGHT: 205 LBS | DIASTOLIC BLOOD PRESSURE: 78 MMHG | BODY MASS INDEX: 41.33 KG/M2

## 2024-06-24 DIAGNOSIS — Z76.82 ORGAN TRANSPLANT CANDIDATE: ICD-10-CM

## 2024-06-24 LAB
ASCENDING AORTA: 2.92 CM
AV INDEX (PROSTH): 0.91
AV MEAN GRADIENT: 8 MMHG
AV PEAK GRADIENT: 12 MMHG
AV VALVE AREA BY VELOCITY RATIO: 3.63 CM²
AV VALVE AREA: 3.98 CM²
AV VELOCITY RATIO: 0.83
BSA FOR ECHO PROCEDURE: 1.97 M2
CV ECHO LV RWT: 0.34 CM
CV STRESS BASE HR: 67 BPM
DIASTOLIC BLOOD PRESSURE: 92 MMHG
DOP CALC AO PEAK VEL: 1.76 M/S
DOP CALC AO VTI: 38.69 CM
DOP CALC LVOT AREA: 4.4 CM2
DOP CALC LVOT DIAMETER: 2.36 CM
DOP CALC LVOT PEAK VEL: 1.46 M/S
DOP CALC LVOT STROKE VOLUME: 154.03 CM3
DOP CALCLVOT PEAK VEL VTI: 35.23 CM
E WAVE DECELERATION TIME: 293.91 MSEC
E/A RATIO: 1.42
E/E' RATIO: 11.88 M/S
ECHO LV POSTERIOR WALL: 0.9 CM (ref 0.6–1.1)
EJECTION FRACTION- HIGH: 59 %
END DIASTOLIC INDEX-HIGH: 155 ML/M2
END DIASTOLIC INDEX-LOW: 91 ML/M2
END SYSTOLIC INDEX-HIGH: 78 ML/M2
END SYSTOLIC INDEX-LOW: 40 ML/M2
FRACTIONAL SHORTENING: 42 % (ref 28–44)
INTERVENTRICULAR SEPTUM: 0.9 CM (ref 0.6–1.1)
IVRT: 125.59 MSEC
LA MAJOR: 5.68 CM
LA MINOR: 4.72 CM
LA WIDTH: 3.41 CM
LEFT ATRIUM SIZE: 4.19 CM
LEFT ATRIUM VOLUME INDEX MOD: 29.6 ML/M2
LEFT ATRIUM VOLUME INDEX: 33.7 ML/M2
LEFT ATRIUM VOLUME MOD: 55 CM3
LEFT ATRIUM VOLUME: 62.61 CM3
LEFT INTERNAL DIMENSION IN SYSTOLE: 3.05 CM (ref 2.1–4)
LEFT VENTRICLE DIASTOLIC VOLUME INDEX: 74.58 ML/M2
LEFT VENTRICLE DIASTOLIC VOLUME: 138.72 ML
LEFT VENTRICLE MASS INDEX: 94 G/M2
LEFT VENTRICLE SYSTOLIC VOLUME INDEX: 19.5 ML/M2
LEFT VENTRICLE SYSTOLIC VOLUME: 36.32 ML
LEFT VENTRICULAR INTERNAL DIMENSION IN DIASTOLE: 5.3 CM (ref 3.5–6)
LEFT VENTRICULAR MASS: 174.52 G
LEFT VENTRICULAR OUTFLOW TRACT PEAK GRADIENT REST: 10 MMHG
LV LATERAL E/E' RATIO: 11.22 M/S
LV SEPTAL E/E' RATIO: 12.63 M/S
MV A" WAVE DURATION": 15.6 MSEC
MV PEAK A VEL: 0.71 M/S
MV PEAK E VEL: 1.01 M/S
NUC REST DIASTOLIC VOLUME INDEX: 108
NUC REST EJECTION FRACTION: 57
NUC REST SYSTOLIC VOLUME INDEX: 62
NUC STRESS DIASTOLIC VOLUME INDEX: 123
NUC STRESS EJECTION FRACTION: 60 %
NUC STRESS SYSTOLIC VOLUME INDEX: 49
OHS CV CPX 1 MINUTE RECOVERY HEART RATE: 99 BPM
OHS CV CPX 85 PERCENT MAX PREDICTED HEART RATE MALE: 160
OHS CV CPX MAX PREDICTED HEART RATE: 188
OHS CV CPX PATIENT IS FEMALE: 1
OHS CV CPX PATIENT IS MALE: 0
OHS CV CPX PEAK DIASTOLIC BLOOD PRESSURE: 79 MMHG
OHS CV CPX PEAK HEAR RATE: 81 BPM
OHS CV CPX PEAK RATE PRESSURE PRODUCT: NORMAL
OHS CV CPX PEAK SYSTOLIC BLOOD PRESSURE: 169 MMHG
OHS CV CPX PERCENT MAX PREDICTED HEART RATE ACHIEVED: 46
OHS CV CPX RATE PRESSURE PRODUCT PRESENTING: NORMAL
PISA TR MAX VEL: 2.53 M/S
PULM VEIN S/D RATIO: 0.98
PV PEAK D VEL: 0.59 M/S
PV PEAK S VEL: 0.58 M/S
RA MAJOR: 4.67 CM
RA PRESSURE ESTIMATED: 3 MMHG
RA WIDTH: 3.59 CM
RETIRED EF AND QEF - SEE NOTES: 47 %
RIGHT ATRIAL AREA: 16 CM2
RIGHT VENTRICLE DIASTOLIC BASEL DIMENSION: 3.5 CM
RV TB RVSP: 6 MMHG
SINUS: 2.71 CM
STJ: 2.56 CM
SYSTOLIC BLOOD PRESSURE: 161 MMHG
TDI LATERAL: 0.09 M/S
TDI SEPTAL: 0.08 M/S
TDI: 0.09 M/S
TR MAX PG: 26 MMHG
TRICUSPID ANNULAR PLANE SYSTOLIC EXCURSION: 2.34 CM
TV REST PULMONARY ARTERY PRESSURE: 29 MMHG
Z-SCORE OF LEFT VENTRICULAR DIMENSION IN END DIASTOLE: 0.24
Z-SCORE OF LEFT VENTRICULAR DIMENSION IN END SYSTOLE: -0.36

## 2024-06-24 PROCEDURE — A9502 TC99M TETROFOSMIN: HCPCS | Mod: TXP | Performed by: NURSE PRACTITIONER

## 2024-06-24 PROCEDURE — 93306 TTE W/DOPPLER COMPLETE: CPT | Mod: 26,TXP,, | Performed by: INTERNAL MEDICINE

## 2024-06-24 PROCEDURE — 93306 TTE W/DOPPLER COMPLETE: CPT | Mod: TXP

## 2024-06-24 PROCEDURE — 93016 CV STRESS TEST SUPVJ ONLY: CPT | Mod: TXP,,, | Performed by: INTERNAL MEDICINE

## 2024-06-24 PROCEDURE — 78452 HT MUSCLE IMAGE SPECT MULT: CPT | Mod: 26,TXP,, | Performed by: INTERNAL MEDICINE

## 2024-06-24 PROCEDURE — 93017 CV STRESS TEST TRACING ONLY: CPT | Mod: TXP

## 2024-06-24 PROCEDURE — 63600175 PHARM REV CODE 636 W HCPCS: Mod: TXP | Performed by: NURSE PRACTITIONER

## 2024-06-24 PROCEDURE — 93018 CV STRESS TEST I&R ONLY: CPT | Mod: TXP,,, | Performed by: INTERNAL MEDICINE

## 2024-06-24 RX ORDER — AMINOPHYLLINE 25 MG/ML
75 INJECTION, SOLUTION INTRAVENOUS
Status: COMPLETED | OUTPATIENT
Start: 2024-06-24 | End: 2024-06-24

## 2024-06-24 RX ORDER — REGADENOSON 0.08 MG/ML
0.4 INJECTION, SOLUTION INTRAVENOUS
Status: COMPLETED | OUTPATIENT
Start: 2024-06-24 | End: 2024-06-24

## 2024-06-24 RX ADMIN — TETROFOSMIN 10.4 MILLICURIE: 1.38 INJECTION, POWDER, LYOPHILIZED, FOR SOLUTION INTRAVENOUS at 09:06

## 2024-06-24 RX ADMIN — TETROFOSMIN 32.2 MILLICURIE: 1.38 INJECTION, POWDER, LYOPHILIZED, FOR SOLUTION INTRAVENOUS at 10:06

## 2024-06-24 RX ADMIN — AMINOPHYLLINE 75 MG: 25 INJECTION, SOLUTION INTRAVENOUS at 10:06

## 2024-06-24 RX ADMIN — REGADENOSON 0.4 MG: 0.08 INJECTION, SOLUTION INTRAVENOUS at 10:06

## 2024-06-26 ENCOUNTER — TELEPHONE (OUTPATIENT)
Dept: TRANSPLANT | Facility: CLINIC | Age: 34
End: 2024-06-26
Payer: MEDICARE

## 2024-07-01 ENCOUNTER — TELEPHONE (OUTPATIENT)
Dept: TRANSPLANT | Facility: CLINIC | Age: 34
End: 2024-07-01
Payer: MEDICARE

## 2024-07-01 NOTE — TELEPHONE ENCOUNTER
MA notes per Pre Dialysis adherence form     FOR THE PAST THREE MONTHS:    0-Appt Adhrence  0-No show    No concerns with labs, care giving, transportation, or mental health    Scanned in pt's media     Itzel Owens  Abdominal Transplant MA

## 2024-07-05 ENCOUNTER — COMMITTEE REVIEW (OUTPATIENT)
Dept: TRANSPLANT | Facility: CLINIC | Age: 34
End: 2024-07-05
Payer: MEDICARE

## 2024-07-05 NOTE — LETTER
July 8, 2024    Dr. Demian Fu  [address] [place] [address]  Phone: [phone]  Fax: [phone]       Dear Dr. Demian Fu:    Patient: Jaylon Hanks   MR Number: [id number]   YOB: 1990     Your patient, Jaylon Hanks, was recently discussed at the [facility] Selection Committee meeting on 7/5/2024. I am happy to inform you that Jaylon has been approved for transplantation.  She has met selection criteria for a kidney transplant related to CKD stage 4 secondary to primary diagnosis of Systemic Lupus Erythematosus. Your patient will be placed on the cadaveric wait list pending final financial approval from insurance company.     We appreciate your confidence in allowing us to participate in your patients care.  If you have any questions or concerns, please do not hesitate to contact me.    Sincerely,      Prisca Evans MD  Medical Director, Kidney & Kidney/Pancreas Transplantation  jr  Cc: Jaylon Hanks (Patient)

## 2024-07-05 NOTE — COMMITTEE REVIEW
Native Organ Dx: Systemic Lupus Erythematosus      SELECTION COMMITTEE NOTE    Jaylon Hanks was presented at selection committee on 07/05/24 .  Patient met selection criteria for kidney transplant related to Chronic kidney disease, Stage IV due to primary diagnosis. No absolute contraindications to transplant at this time.  Patient will be placed on the cadaveric wait list pending final financial approval from insurance company.  Patient will return to clinic for routine appointment in 1 year(s). Patient does not meet criteria for High KDPI kidney offer. Patient meets HCV+ kidney offer. Patient does not meet criteria for dual/enbloc, due to guidelines.    Planned immunosuppression Thymoglobulin.    Called patient to inform her of decision made by committee, she verbalized understanding.     Note written by Maurice Lucas RN.     ===============================================    I was present at the meeting and attest to the general consensus of the committee.   Romel Kaplan Jr.

## 2024-07-08 ENCOUNTER — EPISODE CHANGES (OUTPATIENT)
Dept: TRANSPLANT | Facility: CLINIC | Age: 34
End: 2024-07-08

## 2024-07-10 ENCOUNTER — TELEPHONE (OUTPATIENT)
Dept: TRANSPLANT | Facility: CLINIC | Age: 34
End: 2024-07-10
Payer: MEDICARE

## 2024-07-10 ENCOUNTER — PATIENT MESSAGE (OUTPATIENT)
Dept: TRANSPLANT | Facility: CLINIC | Age: 34
End: 2024-07-10
Payer: MEDICARE

## 2024-07-10 VITALS — WEIGHT: 205 LBS | HEIGHT: 61 IN | BODY MASS INDEX: 38.71 KG/M2

## 2024-07-10 DIAGNOSIS — Z76.82 ORGAN TRANSPLANT CANDIDATE: Primary | ICD-10-CM

## 2024-07-10 DIAGNOSIS — Z76.82 PRE-KIDNEY TRANSPLANT, LISTED: Primary | ICD-10-CM

## 2024-07-10 NOTE — TELEPHONE ENCOUNTER
Checked referral with Mellissa Delaroas RN, to double check on diagnosis of lupus (mentioned in committee note). Patient's diagnosis on referral is hypertensive nephrosclerosis, changed in EPIC. LUPUS labs done in April 2024 were negative.

## 2024-07-10 NOTE — LETTER
July 10, 2024    Rebeccabonita Demario  [address] [facility] [address]    Dear Jaylon Hanks:  MRN: [id number]    Congratulations! On 7/10/2024, you were placed on  the waiting list for a  donor transplant.    Your candidacy for kidney transplant is based on the following criteria:Chronic Kidney Disease, Stage IV due to hypertensive nephrosclerosis .    Your transplant coordinator while on the waiting list is Caro Adrian RN. They can be reached at [phone] or [phone] with any questions.      What to do now?    Ask your living donors to begin testing   Share our screening website with anyone interested: [email or web address]  Make sure donors have your name and date of birth  You will get transplanted much faster if you have a living donor    Have your blood sent to our Transplant Lab every month  If you are on dialysis - our Transplant Lab will work with your dialysis unit to send your blood every month  If you are not on dialysis   If you live near an [facility] lab, we will schedule you to have blood drawn every month  If you do not live near an [facility] lab, you will be sent blood kits in the mail. You will need to take a kit to your local lab or doctor to have your blood drawn every month and mail to the Transplant Lab.     Call us with ANY CHANGES  Phone numbers - we must be able to reach you anytime of the day or night when a kidney is available  Address  Insurance coverage  Dialysis unit or kidney doctor  Kofi: if you have surgery, stay in the hospital, have to get blood, or have an infection    Review your Kidney/Kidney-Pancreas Transplant Guide   This will give you detailed information about what happens when  you are on the waiting list   you are called when a kidney is available    The [facility] has a transplant surgeon and physician available 365 days a year, 24 hours a day to coordinate organ acceptance, procurement, surgical placement and to address  urgent patient care issues.  You will be notified in writing of any changes to our Transplant Centers staffing plan that would impact your ability to receive a transplant.    Attached is a letter from the [employer] for Organ Sharing (UNOS). It describes the services and information offered to patients by UNOS and the Organ Procurement and Transplant Network. We look forward to working with you while on the waiting list.     We would like to inform you of an important OPTN (Organ Procurement and Transplant Network) Policy change that may affect the waiting time for some candidates on our waiting list.     Waiting time is important in identifying who receives offers for kidneys. A long wait time may increase your chance of getting an offer. Wait time is based on a test called eGFR that tells how well your kidneys are working. Wait time could also be based on how long you have been on dialysis. Government and health officials have changed the way this test is used. Before this year, hospitals used an eGFR that would include your race. For Black or  Americans, this eGFR could have shown that their kidneys were working better than they were.    Because of this change, we are looking at everyones record and assessing waiting time for people who are eligible. We will be reviewing everyones medical records and will contact you if you are eligible.     Who can I talk to if I have a question?  You can contact us if you have questions or send a message through [facility].     Please give us time to answer your questions. We are working on this for many patients.    How can I learn more about eGFR and this policy change?  Go to OPTN website > Patients > Kidney > FAQ: Understanding race-neutral eGFR calculations  Full URL: https://optn.transplant.hrsa.gov/patients/by-organ/kidney/understanding-the-proposal-to-require-race-neutral-egfr-calculations/  Call the Organ Procurement and Transplantation Network (OPTN)  toll-free patient services line at [phone]    Congratulations,    Your Transplant Partner  [facility]   [facility] [place] [address]  [phone] office  [phone] fax  jr  Cc: Demian Fu MD                                                            The Organ Procurement and Transplantation Network   Toll-free patient services line: [phone]  Your resource for organ transplant information      Staffed 8:30 am - 5:00 pm ET Monday - Friday   Leave a message 24/7 to receive a call back    The Organ Procurement and Transplantation Network (OPTN) is the national transplant system. It makes the policies that decide how donated organs are matched to patients waiting for a transplant. The OPTN:    Makes sure donated organs get matched to people on the transplant waiting list  Tells people about the donation and transplant processes  Makes sure that the public knows about the need for more organ and tissue donations    The OPTN has a free patient services line that you can call to:  Get more information about:   o Organ donation and organ transplants   o Donation and transplant policies  Get an information kit with:   o A list of transplant hospitals   o Waiting list information  Talk about any questions you may have about your transplant hospital or organ procurement organization. The staff will do their best to help you or point you to others who may help.  Find out how you can volunteer with the OPTN and help shape transplant policy    The patient services line number is: 4-539-768-8510    Patient services line staff CANNOT answer questions about your own medical care, including:  Waiting list status  Test results  Medical records  You will need to call your transplant hospital for this information.    The following websites have more information about transplantation and donation:  OPTN: https://optn.transplant.hrsa.gov/  For potential living donors  and transplant recipients:   o Living with transplant: https://www.transplantliving.org/   o Living donation process: https://optn.transplant.hrsa.gov/living-donation/     o Financial assistance: https://www.livingdonorassistance.org/  Transplantation data: https://www.srtr.org/  Organ donation: https://www.organdonor.gov/    Volunteer with the OPTN: https://optn.transplant.hrsa.gov/get-involved

## 2024-07-10 NOTE — TELEPHONE ENCOUNTER
"KIDNEY WAIT LISTING NOTE    Date of Financial clearance to list: 24    SSN/[facility]:     Organ: Kidney    Last Name: Demario  First Name: Jaylon    : 1990       Gender: female        MRN#: [id number]                                   State of Permanent Residence: [address] [place] [id number]    Ethnicity: Not  or /a   Race:      Black or     CLINICAL INFORMATION   Candidate Medical Urgency Status: Active (1)  Number of Previous Kidney Transplants: 0  Number of Previous Solid Organ Transplants: 0  Did you enter number of previous kidney or other solid organ transplants? Yes  Is this Candidate a Prior Living Donor: No  (If yes, please generate letter to UNOS with patient's date of donation, recipient SSN, signed by Surgical Director after patient is listed in order to receive priority points).      ABO  ABO Blood Group:   B POS     ABO Confirmation: (THESE DATES MUST BE PRIOR TO THE LIST DATE AND SUPPORTED BY SEPARATE LAB REPORTS)    Internal Results    Lab Results   Component Value Date    GROUPTRH B POS 2024    GROUPTR B POS 2024     No results found for: "ABO"    External Results    ABO Date 1:    ABO Date 2  Are either of these ABO results based on External Labs? No  (If Yes, STOP and go to source document in Media Tab for verification).    VITALS  Height: 5' 1.22"   Weight: 207 LBS   (Use height from Transplant clinic visits only).  Did you enter height/weight? Yes    HLA    Class I:  Lab Results   Component Value Date    HPXJ6DR 3 2024    UXSV1ZG 74 2024    XOZG5MY 72 2024    XINR8PM 27 2024    FHYIK3VI 6 2024    GJOBD8LY 4 2024    CJMKZ8IE 1 2024    CMVPR2XE 2 2024       Class II:  Lab Results   Component Value Date    RQHTPY87AJ 103 2024    BRJCAL01XH 12 2024    XPSVOT452JA 52 2024    NPHTUK6792 XX 2024    NQKUK2WZ 7 2024    CGXGA1AM 5 2024       Tested for " "HLA Antibodies: Yes, antibodies detected     If result is "Positive" antibodies are detected     If result is "Negative or questionable" no antibodies detected    No results found for: "CIPRAS", "CIIPRAS"    DIALYSIS INFORMATION  Is patient Pre-Dialysis: No     GFR Information  Report GFR being used as the criteria for placement on the kidney list. If not, leave blank  GFR < or = 20 ml/min? n/a  If Yes, Specify value  ___   ml/min     Initial date GFR became 20 or less:   Is GFR obtained from an Outside lab Result? n/a  (If YES verify with source document scanned into media)    If patient on Dialysis:    Is candidate currently on dialysis for ESRD? Yes  If Yes,  Date Chronic Dialysis Started:  11/30/2020  (verify with source document in Media Tab)   Dialysis Unit Name: [facility] [address] [address]    Physician Name:  Dr. Prisca Garcia  NPI#: [id number]    DIABETES INFORMATION  Primary Native Kidney Diagnosis: Systemic Lupus Erythematosus  C-Peptide Value - No results found for: "CPEPTIDE"  Current Diabetes Status: None    FOR NON-KIDNEY DEPARTMENT USE ONLY:  Additional Organs Registered? none    Maximum Acceptable Number of HLA Mismatches  ABDR:     6      (0-6)               AB:               (0-4)  ADR:   _____  (0-4)              BDR: _____ (0-4)  A:        _____  (0-2)              B:      _____ (0-2)          DR: ______ (0-2)    Will Recipient Accept?   Accept HBcAB Positive Organ:            Yes  Accept HBV STEPH Positive Organ:        No  Accept HCV Antibody Positive Organ: Yes   Accept HCV STEPH Positive Organ: Yes    Dual Kidney and En Bloc Opt In : No  Dual  Local:   No  Dual Import:   No  En Bloc Local:   No  En Bloc Import: No     Accept KDPI > 85: Single: No     Local: No     Import: No  Accept KDPI > 85: Dual: No     Local: No     Import: No    ### NURSE TO VERIFY CONSENT AND MAKE ANY NECESSARY CHANGES NEEDED IN UNET AT THE TIME OF VERIFICATION " ###    Unacceptible Antigens  If yes, list     Lab Results   Component Value Date    EF1ZZHJ B46 04/18/2024    CIABCLM H05--Tdcu A66, A80 04/18/2024       ### DO NOT LIST IF ANTIGEN VALUE WEAK ###    eGFR Wait Time Modification    Based on Race Black or  does patient qualify for lab review? Yes      If yes, were qualifying SPAN 20 labs found? No      If found, generate eGFR Wait Time Modification Form and scan into Media.   Send patient letter when wait time is granted.     Hep B Vaccine series completed: unknown    Blood Type x2 was verified by CHAITANYA Angeles, and myself.   Blood type determination and reporting was completed according to the programs protocols and OPTN requirements.

## 2024-10-07 ENCOUNTER — TELEPHONE (OUTPATIENT)
Dept: TRANSPLANT | Facility: CLINIC | Age: 34
End: 2024-10-07
Payer: MEDICARE

## 2025-01-30 DIAGNOSIS — Z76.82 ORGAN TRANSPLANT CANDIDATE: ICD-10-CM

## 2025-01-30 DIAGNOSIS — N18.6 END STAGE RENAL DISEASE: Primary | ICD-10-CM

## 2025-02-02 ENCOUNTER — DOCUMENTATION ONLY (OUTPATIENT)
Dept: TRANSPLANT | Facility: CLINIC | Age: 35
End: 2025-02-02
Payer: MEDICARE

## 2025-02-02 NOTE — PROGRESS NOTES
ON-CALL NOTE    UNOS# [id number]    Notified by Juan Rosen, , that Jaylon Hanks is eligible for back up kidney offer.  Spoke with patient and identified no acute medical issues with telephone assessment. Protocol script read to patient regarding N/A, standard donor offer. Patient verbalized understanding, all questions answered, patient accepts organ offer. Notified by Juan Rosen that virtual crossmatch is negative.  Pt will need fresh sample if admitted. Patient reports no sensitizing event since last blood sample for PRA received. Will notify HLA Lab to perform a retrospective  crossmatch per guideline if admitted.    Patient was asked if they have had a positive COVID-19 test or if they have any signs or symptoms. Informed patient that they will be tested for COVID-19 upon arrival to the hospital, unless have a previous positive result. If tested and result is positive, the transplant will not be able to occur, they will be inactivated on the wait list for 21 days per protocol and required to quarantine.     Patient notified of plan to continue all normal activities and states understanding.

## 2025-02-03 ENCOUNTER — TELEPHONE (OUTPATIENT)
Dept: TRANSPLANT | Facility: CLINIC | Age: 35
End: 2025-02-03
Payer: MEDICARE

## 2025-02-03 NOTE — TELEPHONE ENCOUNTER
ON-CALL NOTE    UNOS # [id number]    Received notification from Juan Rosen, , that an OR time has been set for 2/4/25 at 0600.      Patient has been notified and updated with this information.  Instructed pt to resume with daily activities at this time and we will update her with new information when available.  Pt verbalized understanding.     2/4/25 2540:  Report given and case passed on to Johanna SEYMOUR

## 2025-02-04 ENCOUNTER — TELEPHONE (OUTPATIENT)
Dept: TRANSPLANT | Facility: CLINIC | Age: 35
End: 2025-02-04
Payer: MEDICARE

## 2025-02-05 ENCOUNTER — TELEPHONE (OUTPATIENT)
Dept: TRANSPLANT | Facility: CLINIC | Age: 35
End: 2025-02-05
Payer: MEDICARE

## 2025-02-05 NOTE — LETTER
2025    Jaylon Hanks  [address] [facility] [address] [facility] [address]        Dear Jaylon Hanks:  MRN: [id number]  : 1990    You are scheduled on 2025 for follow up in the transplant clinic to update your records and evaluate your health status as you wait for a transplant.  It is very important that we ensure you are ready for your transplant.      Please make arrangements to be in our transplant clinic from 12:30 pm- 4 pm.  During this time you will watch an educational video and then be seen by our transplant , financial counselor, and advance practice provider.     If you have had a change in your medical history since your last visit, please call your transplant coordinator to ensure we have the medical records to review prior to your appointment.     Please bring the following with you to this appointment:  A Caregiver is REQUIRED  Bring ALL insurance information including medication card  Current list of medications  Copies of any outside medical records from the past year, such as: mammogram, pap smear, ultrasound, CAT scan, colonoscopy, cardiac angiogram or cardiac stress test    To reschedule your appointments please call [phone] or [phone] ([phone]). Please ask for the .      Failure to cancel in advance or arrive on time could result in a $50 cancellation fee.  Your transplant candidacy could be affected by no showing these appointments.  We look forward to seeing you.    Sincerely,    [facility] [place]  [facility] [place] [address]  [phone]

## 2025-02-05 NOTE — TELEPHONE ENCOUNTER
[facility] # [id number]     2/4/2025 07:50  Report received from Linette Fermin RN     08:30 A phone call from Juan SORIA, , notifying me Donor OR time changed to 10 am.    2/5 07:00 am   Notified by Juan SORIA , that pt can be released from case due to another patient got transplanted. Patient was released from the case by notifying by phone.

## 2025-04-15 ENCOUNTER — TELEPHONE (OUTPATIENT)
Dept: TRANSPLANT | Facility: CLINIC | Age: 35
End: 2025-04-15
Payer: MEDICAID

## 2025-04-15 NOTE — TELEPHONE ENCOUNTER
ON-CALL NOTE    OS# [id number]    Notified by Dave Cruz, , that Jaylon Hanks is eligible for kidney offer.  Spoke with patient and identified no acute medical issues with telephone assessment. Protocol script read to patient regarding N/A, standard donor offer. Patient verbalized understanding, all questions answered, patient accepts organ offer. Notified by Dave Cruz that virtual crossmatch is negative with no DSA from sample dated 1/31/2025.  No current sample of blood available for crossmatch.  Patient reports no sensitizing event since last blood sample for PRA received. Fresh sample will be needed upon admit.  HLA Lab to perform a retrospective  crossmatch per guideline.    Patient was asked if they have had a positive COVID-19 test or if they have any signs or symptoms. Informed patient that they will be tested for COVID-19 upon arrival to the hospital, unless have a previous positive result. If tested and result is positive, the transplant will not be able to occur, they will be inactivated on the wait list for 21 days per protocol and required to quarantine.     Patient notified of plan to remain at home on standby until we have more information and states understanding.    9:30 PM- Updated patient with donor OR time.  Let her know that we would call tomorrow afternoon or evening with further updates.  Patient remains on standby at this time.

## 2025-04-16 ENCOUNTER — TELEPHONE (OUTPATIENT)
Dept: TRANSPLANT | Facility: CLINIC | Age: 35
End: 2025-04-16
Payer: MEDICAID

## 2025-04-16 NOTE — TELEPHONE ENCOUNTER
ON-CALL NOTE    UNOS # [id number]    Received message from pre-coordinator that patient does not have medication Medicare Part D coverage and will have to be released from this case.  Notified  Dave Cruz of this information.  Spoke to patient and explained the need for medication coverage which she understood and will work on getting coverage prior to next offer.  Explained that patient will be removed from this offer, patient voiced understanding and did not have any questions.

## 2025-04-28 ENCOUNTER — TELEPHONE (OUTPATIENT)
Dept: TRANSPLANT | Facility: CLINIC | Age: 35
End: 2025-04-28
Payer: MEDICAID

## 2025-05-01 ENCOUNTER — TELEPHONE (OUTPATIENT)
Dept: TRANSPLANT | Facility: CLINIC | Age: 35
End: 2025-05-01
Payer: MEDICAID

## 2025-05-01 NOTE — TELEPHONE ENCOUNTER
Spoke to pt confirming rescheduled test and clinic visit on 07/18/2025. Appt reminders were mailed and pt is aware to bring care giver.

## 2025-05-02 ENCOUNTER — TELEPHONE (OUTPATIENT)
Dept: TRANSPLANT | Facility: CLINIC | Age: 35
End: 2025-05-02
Payer: MEDICAID

## 2025-05-02 NOTE — TELEPHONE ENCOUNTER
Spoke to patient, she is trying to get established with a gynecologist close to home. I explained that usually pap smears are every 3 years, but the team would like a yearly pelvic exam while on the waitlist. Pt will let me know once she has an appointment.

## 2025-05-07 ENCOUNTER — TELEPHONE (OUTPATIENT)
Dept: TRANSPLANT | Facility: CLINIC | Age: 35
End: 2025-05-07
Payer: MEDICARE

## 2025-05-07 NOTE — TELEPHONE ENCOUNTER
----- Message from Luci Méndez sent at 5/6/2025  3:12 PM CDT -----  Regarding: RE: 05/02 - Appt.  Reinier Elizondo,Just got off the phone with  and patient authorization and [employer] contract on file for patient is valid and patient can be seen at [facility]. Insurance complication has been resolved and patient can be re_scheduled!ThanksLuci  ----- Message -----  From: Caro Adrian RN  Sent: 5/1/2025  10:25 AM CDT  To: Luci Méndez  Subject: RE: 05/02 - Appt.                                Ok abdi caught that one in time. I called  her to cancel her appts. Will send clinicals soon.  ----- Message -----  From: Luci Méndez  Sent: 5/1/2025  10:11 AM CDT  To: Caro Adrian RN  Subject: 05/02 - Appt.                                    Good Morning,Patient insurance switched to [facility] (Dual) on 05/01/2025 and will need to be rescheduled to a later date due to no authorization on file. Can you please send updated clinicals to me at ([email or web address]) so I can initiate and send clinicals for medical approval! Patient will also be an insurance complication until I obtain authorization!ThanksLuci

## 2025-05-27 ENCOUNTER — TELEPHONE (OUTPATIENT)
Dept: TRANSPLANT | Facility: CLINIC | Age: 35
End: 2025-05-27
Payer: MEDICARE

## 2025-05-27 NOTE — TELEPHONE ENCOUNTER
ON-CALL NOTE    UNOS# [id number]    Notified by Callum Mckeon, , that Jaylon Hanks is eligible for kidney offer.  Spoke with patient and identified no acute medical issues with telephone assessment. Protocol script read to patient regarding N/A, standard donor offer. Patient verbalized understanding, all questions answered, patient accepts organ offer. Notified by Callum Mckeon that virtual crossmatch from 1/31/25 has weak DSA but below acceptable range.  Current sample of blood will be needed upon admit for crossmatch.  Patient reports no sensitizing event since last blood sample for PRA received. Will notify HLA Lab to perform a retrospective  crossmatch per guideline if patient is admitted for transplant.    Patient was asked if they have had a positive COVID-19 test or if they have any signs or symptoms. Informed patient that they will be tested for COVID-19 upon arrival to the hospital, unless have a previous positive result. If tested and result is positive, the transplant will not be able to occur, they will be inactivated on the wait list for 21 days per protocol and required to quarantine.     Patient notified of plan and states understanding.

## 2025-05-29 ENCOUNTER — TELEPHONE (OUTPATIENT)
Dept: TRANSPLANT | Facility: CLINIC | Age: 35
End: 2025-05-29
Payer: MEDICARE

## 2025-05-29 NOTE — TELEPHONE ENCOUNTER
[facility] # [id number]    On call update: Per  Dave Alforddakota [place] [facility] accepted the kidneys. Notified patient that she is released from this case and remains actively listed. She states understanding and denies any questions at this time.

## 2025-06-27 ENCOUNTER — TELEPHONE (OUTPATIENT)
Dept: TRANSPLANT | Facility: CLINIC | Age: 35
End: 2025-06-27
Payer: MEDICARE

## 2025-07-18 ENCOUNTER — HOSPITAL ENCOUNTER (OUTPATIENT)
Dept: RADIOLOGY | Facility: HOSPITAL | Age: 35
Discharge: HOME | End: 2025-07-18
Attending: NURSE PRACTITIONER
Payer: MEDICARE

## 2025-07-18 ENCOUNTER — OFFICE VISIT (OUTPATIENT)
Dept: TRANSPLANT | Facility: CLINIC | Age: 35
End: 2025-07-18
Payer: MEDICARE

## 2025-07-18 ENCOUNTER — HOSPITAL ENCOUNTER (OUTPATIENT)
Dept: CARDIOLOGY | Facility: HOSPITAL | Age: 35
Discharge: HOME | End: 2025-07-18
Attending: NURSE PRACTITIONER
Payer: MEDICARE

## 2025-07-18 VITALS
TEMPERATURE: 98 F | DIASTOLIC BLOOD PRESSURE: 72 MMHG | OXYGEN SATURATION: 98 % | SYSTOLIC BLOOD PRESSURE: 134 MMHG | RESPIRATION RATE: 18 BRPM | HEART RATE: 74 BPM | HEIGHT: 62 IN | BODY MASS INDEX: 35.46 KG/M2 | WEIGHT: 192.69 LBS

## 2025-07-18 VITALS
SYSTOLIC BLOOD PRESSURE: 167 MMHG | HEIGHT: 59 IN | HEART RATE: 80 BPM | BODY MASS INDEX: 41.33 KG/M2 | WEIGHT: 205 LBS | DIASTOLIC BLOOD PRESSURE: 78 MMHG

## 2025-07-18 DIAGNOSIS — E66.01 CLASS 2 SEVERE OBESITY DUE TO EXCESS CALORIES WITH SERIOUS COMORBIDITY AND BODY MASS INDEX (BMI) OF 35.0 TO 35.9 IN ADULT: ICD-10-CM

## 2025-07-18 DIAGNOSIS — N25.81 SECONDARY HYPERPARATHYROIDISM OF RENAL ORIGIN: ICD-10-CM

## 2025-07-18 DIAGNOSIS — Z76.82 ORGAN TRANSPLANT CANDIDATE: ICD-10-CM

## 2025-07-18 DIAGNOSIS — D63.1 ANEMIA, CHRONIC RENAL FAILURE, STAGE 5: ICD-10-CM

## 2025-07-18 DIAGNOSIS — N18.5 ANEMIA, CHRONIC RENAL FAILURE, STAGE 5: ICD-10-CM

## 2025-07-18 DIAGNOSIS — Z76.82 PATIENT ON WAITING LIST FOR KIDNEY TRANSPLANT: Primary | ICD-10-CM

## 2025-07-18 DIAGNOSIS — E66.812 CLASS 2 SEVERE OBESITY DUE TO EXCESS CALORIES WITH SERIOUS COMORBIDITY AND BODY MASS INDEX (BMI) OF 35.0 TO 35.9 IN ADULT: ICD-10-CM

## 2025-07-18 DIAGNOSIS — N18.5 BENIGN HYPERTENSION WITH CKD (CHRONIC KIDNEY DISEASE) STAGE V: ICD-10-CM

## 2025-07-18 DIAGNOSIS — I12.0 BENIGN HYPERTENSION WITH CKD (CHRONIC KIDNEY DISEASE) STAGE V: ICD-10-CM

## 2025-07-18 LAB
AORTIC SIZE INDEX (SOV): 1.4 CM/M2
AORTIC SIZE INDEX: 1.6 CM/M2
ASCENDING AORTA: 3 CM
AV AREA BY CONTINUOUS VTI: 2.6 CM2
AV INDEX (PROSTH): 0.81
AV LVOT MEAN GRADIENT: 5 MMHG
AV LVOT PEAK GRADIENT: 8 MMHG
AV MEAN GRADIENT: 6 MMHG
AV PEAK GRADIENT: 9 MMHG
AV VALVE AREA BY VELOCITY RATIO: 2.9 CM²
AV VALVE AREA: 2.6 CM2
AV VELOCITY RATIO: 0.93
BSA FOR ECHO PROCEDURE: 1.97 M2
CV ECHO LV RWT: 0.5 CM
DOP CALC AO PEAK VEL: 1.5 M/S
DOP CALC AO VTI: 33.3 CM
DOP CALC LVOT AREA: 3.1 CM2
DOP CALC LVOT DIAMETER: 2 CM
DOP CALC LVOT PEAK VEL: 1.4 M/S
DOP CALC LVOT STROKE VOLUME: 85.1 CM3
DOP CALCLVOT PEAK VEL VTI: 27.1 CM
E WAVE DECELERATION TIME: 288 MS
E/A RATIO: 1.21
E/E' RATIO: 11 M/S
ECHO EF ESTIMATED: 79 %
ECHO LV POSTERIOR WALL: 1.2 CM (ref 0.6–1.1)
EJECTION FRACTION: 70 %
FRACTIONAL SHORTENING: 47.9 % (ref 28–44)
INTERVENTRICULAR SEPTUM: 1 CM (ref 0.6–1.1)
IVC DIAMETER: 1.13 CM
IVRT: 118 MS
LA MAJOR: 4.8 CM
LA MINOR: 4.7 CM
LA WIDTH: 3.2 CM
LEFT ATRIUM SIZE: 4.4 CM
LEFT ATRIUM VOLUME INDEX MOD: 22 ML/M2
LEFT ATRIUM VOLUME INDEX: 31 ML/M2
LEFT ATRIUM VOLUME MOD: 40 ML
LEFT ATRIUM VOLUME: 57 CM3
LEFT INTERNAL DIMENSION IN SYSTOLE: 2.5 CM (ref 2.1–4)
LEFT VENTRICLE DIASTOLIC VOLUME INDEX: 58.06 ML/M2
LEFT VENTRICLE DIASTOLIC VOLUME: 108 ML
LEFT VENTRICLE MASS INDEX: 104.3 G/M2
LEFT VENTRICLE SYSTOLIC VOLUME INDEX: 12.4 ML/M2
LEFT VENTRICLE SYSTOLIC VOLUME: 23 ML
LEFT VENTRICULAR INTERNAL DIMENSION IN DIASTOLE: 4.8 CM (ref 3.5–6)
LEFT VENTRICULAR MASS: 194 G
LV LATERAL E/E' RATIO: 8.3
LV SEPTAL E/E' RATIO: 15.2
Lab: 1.7 CM/M
Lab: 2 CM/M
MV A" WAVE DURATION": 156.04 MS
MV PEAK A VEL: 0.75 M/S
MV PEAK E VEL: 0.91 M/S
OHS CV CPX PATIENT HEIGHT IN: 59.05
OHS CV RV/LV RATIO: 0.63 CM
PISA TR MAX VEL: 2.7 M/S
PULM VEIN A" WAVE DURATION": 156.04 MS
PULM VEIN S/D RATIO: 1.09
PULMONIC VEIN PEAK A VELOCITY: 0.2 M/S
PV PEAK D VEL: 0.58 M/S
PV PEAK S VEL: 0.63 M/S
RA MAJOR: 4.54 CM
RA PRESSURE ESTIMATED: 3 MMHG
RA WIDTH: 2.99 CM
RIGHT ATRIAL AREA: 11.8 CM2
RIGHT VENTRICLE DIASTOLIC BASEL DIMENSION: 3 CM
RV TB RVSP: 6 MMHG
RV TISSUE DOPPLER FREE WALL SYSTOLIC VELOCITY 1 (APICAL 4 CHAMBER VIEW): 13.36 CM/S
SINUS: 2.6 CM
STJ: 2.7 CM
TDI LATERAL: 0.11 M/S
TDI SEPTAL: 0.06 M/S
TDI: 0.09 M/S
TRICUSPID ANNULAR PLANE SYSTOLIC EXCURSION: 2.6 CM
TV PEAK GRADIENT: 30 MMHG
TV REST PULMONARY ARTERY PRESSURE: 32 MMHG
Z-SCORE OF LEFT VENTRICULAR DIMENSION IN END DIASTOLE: -0.75
Z-SCORE OF LEFT VENTRICULAR DIMENSION IN END SYSTOLE: -1.93

## 2025-07-18 PROCEDURE — 99205 OFFICE O/P NEW HI 60 MIN: CPT | Mod: S$GLB,TXP,, | Performed by: NURSE PRACTITIONER

## 2025-07-18 PROCEDURE — 93306 TTE W/DOPPLER COMPLETE: CPT | Mod: 26,TXP,, | Performed by: STUDENT IN AN ORGANIZED HEALTH CARE EDUCATION/TRAINING PROGRAM

## 2025-07-18 PROCEDURE — 71046 X-RAY EXAM CHEST 2 VIEWS: CPT | Mod: 26,TXP,, | Performed by: RADIOLOGY

## 2025-07-18 PROCEDURE — 93306 TTE W/DOPPLER COMPLETE: CPT | Mod: TXP

## 2025-07-18 PROCEDURE — 71046 X-RAY EXAM CHEST 2 VIEWS: CPT | Mod: TC,TXP

## 2025-07-18 PROCEDURE — 99999 PR PBB SHADOW E&M-EST. PATIENT-LVL IV: CPT | Mod: PBBFAC,TXP,, | Performed by: NURSE PRACTITIONER

## 2025-07-18 PROCEDURE — 74176 CT ABD & PELVIS W/O CONTRAST: CPT | Mod: TC,TXP

## 2025-07-18 PROCEDURE — 74176 CT ABD & PELVIS W/O CONTRAST: CPT | Mod: 26,TXP,, | Performed by: INTERNAL MEDICINE

## 2025-07-18 NOTE — PROGRESS NOTES
Transplant Psychosocial Evaluation Update:  Last psychosocial evaluation for transplant was completed on 4/18/2024  by Gordo Suh LMSW.     Pt presents today in company of Jose Maria Noel, significant other. Pt and significant other  present as alert, oriented to person, place, time, purpose of visit. Pt and significant other deny concerns about going through with transplant and state motivation and sense of preparedness for transplantation, caregiver role, psychosocial risk factors, medical risk factors, financial aspects, and lifetime commitments. All concerns and education points as per transplant psychosocial evaluation process addressed (also refer to psychosocial dated 4/18/2024). Pt actively participated in today's interview, with significant other participating as caregiver. Pt asking questions appropriately and denies changes to previous psychosocial evaluation for transplantation which we reviewed line by line with pt and, per pt choice, with pts caregiver today.    Primary Caregivers and Transportation for Transplant:  1. Jose Maria Noel, 32 yo significant other, drives/own vehicle, [phone]  2. Roz Hanks, 21 yo sister, drives/own vehicle,  [phone]     Both pt and caregiver/s plan to stay locally at [place] location - information reviewed in depth. Caregivers plan to stay at hospital as appropriate for transplant and post-transplant process.  explained in depth lodging requirements if doctors determine patient can't return home after transplant. Patient and caregiver would have to stay within 1 hour from hospital for 4-5 weeks.  provided patient with different lodging options to review. Patient and caregiver informed patient is responsible for any fees associated with lodging after transplant.    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      DME: [facility]     ADLS:  Patient can ambulate, complete ADL's, drive and manage medications  without assistance.    Household and Dependents: Patient resides with significant other and has no dependents at this time.    Income: Patient states ability to afford all monthly expenses and costs including for medications now and if transplanted based on income and on savings and assets. Patient and significant other are employed.     Dialysis Adherence: Patient is pre-dialysis. Compliance form sent to nephrologist office.     Coping: Patient denies having any concerns and/or overwhelming feelings regarding transplant currently. Patient denied needing or wanting mental health resources or referrals at this time. Patient agreed to contact  team as needed.    Patient and significant other deny any additional concerns, stating preparedness and motivation to proceed with organ transplant.     Suitability for Transplant:   Patient remains low risk for transplant at this time based on psychosocial risk factors and strengths. Patient has adequate family and caregiver support, good adherence, appropriate coping skills, medical insurance, reliable transportation, being able to financially can afford medications and not using any substances unless prescribed.    Patient is highly motivated to receive kidney transplant. SW recommends patient contact insurance for lodging benefits, conduct fundraising to assist patient with pay for cost of medications, food, gas, and other transplant related needs. SW recommends that patient remain aware of potential mental health concerns and contact the team if any concerns arise. Patient denied needing or wanting mental health resources or referrals at this time. Patient agreed to contact  team as needed. SW recommends that patient remain abstinent from tobacco, ETOH, and drug use. SW supports patient continued adherence. SW remains available to answer any questions or concerns that arise as the patient moves through the transplant process.     Final  determination of transplant candidacy will be reviewed and determined by the selection committee.      Rhina Barraza LCSW, Kidney Transplant

## 2025-07-18 NOTE — LETTER
July 21, 2025        Demian Fu  [address]  [facility] [address]  Phone: [phone]  Fax: [phone]             Fausto Mayes- Transplant [facility]  [address]  [facility] [address]  Phone: [phone]   Patient: Jaylon Hanks   MR Number: [id number]   YOB: 1990   Date of Visit: 7/18/2025       Dear Dr. Demian Fu    Thank you for referring Jaylon Hanks to me for evaluation. Attached you will find relevant portions of my assessment and plan of care.    If you have questions, please do not hesitate to call me. I look forward to following Jaylon Hanks along with you.    Sincerely,    Loraine Hearn, NP    Enclosure    If you would like to receive this communication electronically, please contact [email or web address] or [phone] to request [employer] access.    [employer] is a tool which provides read-only access to select patient information with whom you have a relationship. Its easy to use and provides real time access to review your patients record including encounter summaries, notes, results, and demographic information.    If you feel you have received this communication in error or would no longer like to receive these types of communications, please e-mail [email or web address]

## 2025-07-18 NOTE — PROGRESS NOTES
AA YEARLY PATIENT EDUCATION NOTE    Ms. Jaylon Hanks was seen in pre-kidney transplant clinic for yearly (or six months) evaluation for kidney, kidney/pancreas or pancreas only transplant.  The patient attended a web based education session that discussed/reviewed the following aspects of transplantation: UNOS waitlist management/multiple listings, types of organs offered (KDPI < 85%, KDPI > 85%, PHS increased risk, DCD, HCV+), financial aspects, surgical procedures, dietary instruction pre- and post-transplant, health maintenance pre- and post-transplant, post-transplant hospitalization and outpatient follow-up, potential to participate in a research protocol, and medication management and side effects. A question and answer session was provided after the presentation.    The patient was seen by all members of the multi-disciplinary team to include: Nephrologist/PA, , , Pharmacist and Dietician (if applicable).    The Patient was educated on OPTN policy change regarding race based eGFR. For Black or  Americans, this eGFR could have shown that their kidneys were working better than they were.    Because of this change, we are looking at everyones record and assessing waiting time for people who are eligible. We will be reviewing your medical records and will notify you if you are eligible. We also encouraged patient to provide span 20 labs that are not in our electronic medical records.    The patient reviewed and signed all consents for evaluation which were witnessed and sent to scanning into the [facility] chart.    The patient was given an education book and plan for further evaluation based on her individual assessment.      The patient was encouraged to call with any questions or concerns.

## 2025-07-18 NOTE — PROGRESS NOTES
Kidney Transplant Recipient Reevalulation    Referring Physician: Demian Fu  Current Nephrologist: Demian Fu  Waitlist Status: active  Dialysis Start Date: (Not currently on dialysis)    Subjective:     CC:  Annual reassessment of kidney transplant candidacy.    HPI:  Ms. Hanks is a 34 y.o. year old Black or  female with advanced kidney disease secondary to HTN.  She has been on the wait list for a kidney transplant at [facility] since 11/30/2020. Patient is currently pre-dialysis. She has a no  access. Patient denies any recent hospitalizations or ED visits.    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 Looks great , not frail     LOV 4/18/24  Interval HX:  Lab /diagnostic results /TXP [facility] reviewed      7/18/25 TTE:  EF 65-70%, PA 32   6/24/24 Lexiscan: Normal myocardial perfusion scan. There is no evidence of myocardial ischemia or infarction.     7/18/25 CTA/P WO: Few atherosclerotic calcifications in the right internal iliac artery. The iliac arteries and abdominal aorta are otherwise spared.     7/18/25 CXR: Stable mild enlargement of the cardiopericardial silhouette with normal pulmonary vasculature.     4/18/24 Renal US:No significant abnormality.   4/18/24 PXR: No significant vascular calcifications identified   5/23/24 PAP: ()-)    Past Medical History:   Diagnosis Date    Anemia     Anemia, chronic renal failure, stage 5 04/18/2024    CKD (chronic kidney disease) stage 5, GFR less than 15 ml/min 04/18/2024    Disorder of kidney and ureter     Hyperparathyroidism, unspecified     Hypertension     Hypertensive nephrosclerosis 04/18/2024    Metabolic acidosis     Obesity     Renal interstitial fibrosis     Secondary hyperparathyroidism of renal origin 04/18/2024    SOB (shortness of breath)     History of    Systemic lupus erythematosus     Urinary tract infection, site not specified     History of  "      Review of Systems   Constitutional:  Negative for activity change, appetite change, chills, fatigue, fever and unexpected weight change.   HENT:  Negative for congestion, facial swelling, postnasal drip, rhinorrhea, sinus pressure, sore throat and trouble swallowing.    Eyes:  Negative for pain, redness and visual disturbance.   Respiratory:  Negative for cough, chest tightness, shortness of breath and wheezing.    Cardiovascular: Negative.  Negative for chest pain, palpitations and leg swelling.   Gastrointestinal:  Negative for abdominal pain, diarrhea, nausea and vomiting.   Genitourinary:  Negative for dysuria, flank pain and urgency.   Musculoskeletal:  Negative for gait problem, neck pain and neck stiffness.   Skin:  Negative for rash.   Allergic/Immunologic: Negative for environmental allergies, food allergies and immunocompromised state.   Neurological:  Negative for dizziness, weakness, light-headedness and headaches.   Psychiatric/Behavioral:  Negative for agitation and confusion. The patient is not nervous/anxious.        Objective: /72 (BP Location: Right arm, Patient Position: Sitting)   Pulse 74   Temp 98 °F (36.7 °C) (Oral)   Resp 18   Ht 5' 2" (1.575 m)   Wt 87.4 kg (192 lb 10.9 oz)   SpO2 98%   BMI 35.24 kg/m²      body mass index is 35.24 kg/m².    Physical Exam  Constitutional:       Appearance: Normal appearance. She is well-developed. She is obese.   HENT:      Head: Normocephalic.      Nose: Nose normal.   Eyes:      Conjunctiva/sclera: Conjunctivae normal.      Pupils: Pupils are equal, round, and reactive to light.   Cardiovascular:      Rate and Rhythm: Normal rate and regular rhythm.      Heart sounds: Normal heart sounds.   Pulmonary:      Effort: Pulmonary effort is normal.      Breath sounds: Normal breath sounds.   Abdominal:      General: Bowel sounds are normal.      Palpations: Abdomen is soft.   Musculoskeletal:      Cervical back: Normal range of motion and neck " "supple.   Skin:     General: Skin is warm and dry.   Neurological:      Mental Status: She is alert and oriented to person, place, and time.   Psychiatric:         Behavior: Behavior normal.         Labs:  Lab Results   Component Value Date    WBC 5.44 04/18/2024    HGB 10.3 (L) 04/18/2024    HCT 33.5 (L) 04/18/2024     04/18/2024    K 3.7 04/18/2024     04/18/2024    CO2 21 (L) 04/18/2024    BUN 45 (H) 04/18/2024    CREATININE 3.8 (H) 04/18/2024    EGFRNORACEVR 15.5 (A) 04/18/2024    CALCIUM 10.0 04/18/2024    PHOS 3.9 04/18/2024    ALBUMIN 3.8 04/18/2024    AST 15 04/18/2024    ALT 13 04/18/2024     (H) 03/31/2025       No results found for: "PREALBUMIN", "BILIRUBINUA", "GGT", "AMYLASE", "LIPASE", "PROTEINUA", "NITRITE", "RBCUA", "WBCUA"    No results found for: "HLAABCTYPE"    Lab Results   Component Value Date    CPRA 0 01/31/2025    CPRA 0 01/31/2025    CPRA 0 01/31/2025    UC0UUXL B46 10/30/2024    CIABCLM WEAK: A24, B46 01/31/2025    CIIAB Negative 07/31/2024    ABCMT WEAK: DP14 01/31/2025       Labs were reviewed with the patient.    Pre-transplant Workup:   Reviewed with the patient.    Assessment:     1. Patient on waiting list for kidney transplant    2. Benign hypertension with CKD (chronic kidney disease) stage V    3. Anemia, chronic renal failure, stage 5    4. Secondary hyperparathyroidism of renal origin    5. Class 2 severe obesity due to excess calories with serious comorbidity and body mass index (BMI) of 35.0 to 35.9 in adult        Plan:     Transplant Candidacy:   Ms. Hanks is a suitable kidney transplant candidate.  Meets center eligibility for accepting HCV+ donor offer - Yes.  Patient educated on HCV+ donors. Jaylon is willing  to accept HCV+ donor offer -  Yes   Patient is a candidate for KDPI > 85 kidney donor offer - No d/t age  She remains in overall stable health, and will remain active on the transplant list.    Patient advised that it is recommended that all " transplant candidates, and their close contacts and household members receive Covid vaccination.    Loraine Hearn NP       Follow-up:   In addition to the tests noted in the plan, Ms. Hanks will continue to have reevaluation as per the standing pre-kidney transplant protocol:  Monthly blood for PRA  Annual return to clinic, except HIV positive, > 65 years of age, or pancreas transplant candidates who will be scheduled to see transplant every 6 months while in pre-transplant phase  Annual re-testing: CXR, EKG, yearly mammograms for women over 40 and PSA for males over 40, cardiology follow-up as recommended by initial cardiology pre-transplant evaluation  Renal ultrasound every 2 years  Baseline colonoscopy after age 50 and repeated as recommended    UNOS Patient Status  Functional Status: 80% - Normal activity with effort: some symptoms of disease  Physical Capacity: No Limitations

## 2025-07-21 ENCOUNTER — TELEPHONE (OUTPATIENT)
Dept: TRANSPLANT | Facility: CLINIC | Age: 35
End: 2025-07-21
Payer: MEDICARE

## 2025-07-21 NOTE — TELEPHONE ENCOUNTER
Spoke to patient, she verbally consents to participate in A2. Her Titer is 4. Will remail the consent form for her to sign. She is willing to go to [place] every 3 months to get the A2 titer drawn.